# Patient Record
Sex: FEMALE | Race: WHITE | Employment: FULL TIME | URBAN - METROPOLITAN AREA
[De-identification: names, ages, dates, MRNs, and addresses within clinical notes are randomized per-mention and may not be internally consistent; named-entity substitution may affect disease eponyms.]

---

## 2018-05-19 ENCOUNTER — HOSPITAL ENCOUNTER (OUTPATIENT)
Age: 53
Discharge: HOME | End: 2018-05-19
Payer: COMMERCIAL

## 2018-05-19 LAB — TSH SERPL DL<=0.05 MIU/L-ACNC: 1.44 UIU/ML (ref 0.27–4.2)

## 2018-05-19 PROCEDURE — 86376 MICROSOMAL ANTIBODY EACH: CPT

## 2018-05-19 PROCEDURE — 84443 ASSAY THYROID STIM HORMONE: CPT

## 2018-05-19 PROCEDURE — 36415 COLL VENOUS BLD VENIPUNCTURE: CPT

## 2018-05-19 PROCEDURE — 86800 THYROGLOBULIN ANTIBODY: CPT

## 2018-05-21 LAB
PATHOLOGIST REVIEW: NORMAL
THYROGLOBULIN AB: <0.9 IU/ML (ref 0–4)
THYROID PEROXIDASE (TPO) ABS: <0.3 IU/ML (ref 0–9)

## 2022-12-01 ENCOUNTER — HOSPITAL ENCOUNTER (INPATIENT)
Age: 57
LOS: 1 days | Discharge: HOME | End: 2022-12-03
Attending: EMERGENCY MEDICINE | Admitting: INTERNAL MEDICINE
Payer: COMMERCIAL

## 2022-12-01 DIAGNOSIS — I48.91 NEW ONSET ATRIAL FIBRILLATION (HCC): Primary | ICD-10-CM

## 2022-12-01 DIAGNOSIS — E07.9 THYROID DISEASE: ICD-10-CM

## 2022-12-01 DIAGNOSIS — I48.91 ATRIAL FIBRILLATION WITH RVR (HCC): ICD-10-CM

## 2022-12-01 PROCEDURE — 99285 EMERGENCY DEPT VISIT HI MDM: CPT

## 2022-12-01 PROCEDURE — 93005 ELECTROCARDIOGRAM TRACING: CPT | Performed by: EMERGENCY MEDICINE

## 2022-12-02 ENCOUNTER — APPOINTMENT (OUTPATIENT)
Dept: INPATIENT UNIT | Age: 57
End: 2022-12-02
Payer: COMMERCIAL

## 2022-12-02 ENCOUNTER — ANESTHESIA (OUTPATIENT)
Dept: INPATIENT UNIT | Age: 57
End: 2022-12-02
Payer: COMMERCIAL

## 2022-12-02 ENCOUNTER — ANESTHESIA EVENT (OUTPATIENT)
Dept: INPATIENT UNIT | Age: 57
End: 2022-12-02
Payer: COMMERCIAL

## 2022-12-02 ENCOUNTER — APPOINTMENT (OUTPATIENT)
Dept: GENERAL RADIOLOGY | Age: 57
End: 2022-12-02
Payer: COMMERCIAL

## 2022-12-02 LAB
ADENOVIRUS BY PCR: NOT DETECTED
ALBUMIN SERPL-MCNC: 4 G/DL (ref 3.5–5.2)
ALP BLD-CCNC: 57 U/L (ref 35–104)
ALT SERPL-CCNC: 26 U/L (ref 0–32)
ANION GAP SERPL CALCULATED.3IONS-SCNC: 7 MMOL/L (ref 7–16)
ANION GAP SERPL CALCULATED.3IONS-SCNC: 9 MMOL/L (ref 7–16)
AST SERPL-CCNC: 29 U/L (ref 0–31)
BASOPHILS ABSOLUTE: 0.01 E9/L (ref 0–0.2)
BASOPHILS ABSOLUTE: 0.02 E9/L (ref 0–0.2)
BASOPHILS RELATIVE PERCENT: 0.3 % (ref 0–2)
BASOPHILS RELATIVE PERCENT: 0.5 % (ref 0–2)
BILIRUB SERPL-MCNC: 0.6 MG/DL (ref 0–1.2)
BORDETELLA PARAPERTUSSIS BY PCR: NOT DETECTED
BORDETELLA PERTUSSIS BY PCR: NOT DETECTED
BUN BLDV-MCNC: 20 MG/DL (ref 6–20)
BUN BLDV-MCNC: 20 MG/DL (ref 6–20)
CALCIUM SERPL-MCNC: 9.6 MG/DL (ref 8.6–10.2)
CALCIUM SERPL-MCNC: 9.8 MG/DL (ref 8.6–10.2)
CHLAMYDOPHILIA PNEUMONIAE BY PCR: NOT DETECTED
CHLORIDE BLD-SCNC: 101 MMOL/L (ref 98–107)
CHLORIDE BLD-SCNC: 104 MMOL/L (ref 98–107)
CO2: 26 MMOL/L (ref 22–29)
CO2: 28 MMOL/L (ref 22–29)
CORONAVIRUS 229E BY PCR: NOT DETECTED
CORONAVIRUS HKU1 BY PCR: NOT DETECTED
CORONAVIRUS NL63 BY PCR: NOT DETECTED
CORONAVIRUS OC43 BY PCR: NOT DETECTED
CREAT SERPL-MCNC: 0.8 MG/DL (ref 0.5–1)
CREAT SERPL-MCNC: 0.9 MG/DL (ref 0.5–1)
D DIMER: <200 NG/ML DDU
EKG ATRIAL RATE: 138 BPM
EKG ATRIAL RATE: 267 BPM
EKG ATRIAL RATE: 80 BPM
EKG P AXIS: 46 DEGREES
EKG P AXIS: 48 DEGREES
EKG P-R INTERVAL: 152 MS
EKG P-R INTERVAL: 180 MS
EKG Q-T INTERVAL: 240 MS
EKG Q-T INTERVAL: 406 MS
EKG Q-T INTERVAL: 408 MS
EKG QRS DURATION: 70 MS
EKG QRS DURATION: 74 MS
EKG QRS DURATION: 76 MS
EKG QTC CALCULATION (BAZETT): 376 MS
EKG QTC CALCULATION (BAZETT): 449 MS
EKG QTC CALCULATION (BAZETT): 468 MS
EKG R AXIS: -129 DEGREES
EKG R AXIS: -80 DEGREES
EKG R AXIS: -89 DEGREES
EKG T AXIS: -2 DEGREES
EKG T AXIS: -84 DEGREES
EKG T AXIS: 50 DEGREES
EKG VENTRICULAR RATE: 148 BPM
EKG VENTRICULAR RATE: 73 BPM
EKG VENTRICULAR RATE: 80 BPM
EOSINOPHILS ABSOLUTE: 0.05 E9/L (ref 0.05–0.5)
EOSINOPHILS ABSOLUTE: 0.05 E9/L (ref 0.05–0.5)
EOSINOPHILS RELATIVE PERCENT: 1.3 % (ref 0–6)
EOSINOPHILS RELATIVE PERCENT: 1.4 % (ref 0–6)
GFR SERPL CREATININE-BSD FRML MDRD: >60 ML/MIN/1.73
GFR SERPL CREATININE-BSD FRML MDRD: >60 ML/MIN/1.73
GLUCOSE BLD-MCNC: 105 MG/DL (ref 74–99)
GLUCOSE BLD-MCNC: 95 MG/DL (ref 74–99)
HCT VFR BLD CALC: 38.8 % (ref 34–48)
HCT VFR BLD CALC: 40.4 % (ref 34–48)
HEMOGLOBIN: 13.1 G/DL (ref 11.5–15.5)
HEMOGLOBIN: 14 G/DL (ref 11.5–15.5)
HUMAN METAPNEUMOVIRUS BY PCR: NOT DETECTED
HUMAN RHINOVIRUS/ENTEROVIRUS BY PCR: NOT DETECTED
IMMATURE GRANULOCYTES #: 0.01 E9/L
IMMATURE GRANULOCYTES #: 0.01 E9/L
IMMATURE GRANULOCYTES %: 0.3 % (ref 0–5)
IMMATURE GRANULOCYTES %: 0.3 % (ref 0–5)
INFLUENZA A BY PCR: NOT DETECTED
INFLUENZA B BY PCR: NOT DETECTED
LYMPHOCYTES ABSOLUTE: 1.12 E9/L (ref 1.5–4)
LYMPHOCYTES ABSOLUTE: 1.19 E9/L (ref 1.5–4)
LYMPHOCYTES RELATIVE PERCENT: 28 % (ref 20–42)
LYMPHOCYTES RELATIVE PERCENT: 32.8 % (ref 20–42)
MAGNESIUM: 1.9 MG/DL (ref 1.6–2.6)
MCH RBC QN AUTO: 32.3 PG (ref 26–35)
MCH RBC QN AUTO: 32.7 PG (ref 26–35)
MCHC RBC AUTO-ENTMCNC: 33.8 % (ref 32–34.5)
MCHC RBC AUTO-ENTMCNC: 34.7 % (ref 32–34.5)
MCV RBC AUTO: 94.4 FL (ref 80–99.9)
MCV RBC AUTO: 95.6 FL (ref 80–99.9)
MONOCYTES ABSOLUTE: 0.3 E9/L (ref 0.1–0.95)
MONOCYTES ABSOLUTE: 0.36 E9/L (ref 0.1–0.95)
MONOCYTES RELATIVE PERCENT: 8.3 % (ref 2–12)
MONOCYTES RELATIVE PERCENT: 9 % (ref 2–12)
MYCOPLASMA PNEUMONIAE BY PCR: NOT DETECTED
NEUTROPHILS ABSOLUTE: 2.07 E9/L (ref 1.8–7.3)
NEUTROPHILS ABSOLUTE: 2.44 E9/L (ref 1.8–7.3)
NEUTROPHILS RELATIVE PERCENT: 56.9 % (ref 43–80)
NEUTROPHILS RELATIVE PERCENT: 60.9 % (ref 43–80)
PARAINFLUENZA VIRUS 1 BY PCR: NOT DETECTED
PARAINFLUENZA VIRUS 2 BY PCR: NOT DETECTED
PARAINFLUENZA VIRUS 3 BY PCR: NOT DETECTED
PARAINFLUENZA VIRUS 4 BY PCR: NOT DETECTED
PDW BLD-RTO: 11.9 FL (ref 11.5–15)
PDW BLD-RTO: 12 FL (ref 11.5–15)
PLATELET # BLD: 162 E9/L (ref 130–450)
PLATELET # BLD: 166 E9/L (ref 130–450)
PMV BLD AUTO: 9.2 FL (ref 7–12)
PMV BLD AUTO: 9.3 FL (ref 7–12)
POTASSIUM REFLEX MAGNESIUM: 3.7 MMOL/L (ref 3.5–5)
POTASSIUM SERPL-SCNC: 3.5 MMOL/L (ref 3.5–5)
PRO-BNP: 191 PG/ML (ref 0–125)
RBC # BLD: 4.06 E12/L (ref 3.5–5.5)
RBC # BLD: 4.28 E12/L (ref 3.5–5.5)
RESPIRATORY SYNCYTIAL VIRUS BY PCR: NOT DETECTED
SARS-COV-2, PCR: NOT DETECTED
SODIUM BLD-SCNC: 137 MMOL/L (ref 132–146)
SODIUM BLD-SCNC: 138 MMOL/L (ref 132–146)
T4 FREE: 1 NG/DL (ref 0.93–1.7)
TOTAL PROTEIN: 6.5 G/DL (ref 6.4–8.3)
TROPONIN, HIGH SENSITIVITY: 11 NG/L (ref 0–9)
TSH SERPL DL<=0.05 MIU/L-ACNC: 7.79 UIU/ML (ref 0.27–4.2)
WBC # BLD: 3.6 E9/L (ref 4.5–11.5)
WBC # BLD: 4 E9/L (ref 4.5–11.5)

## 2022-12-02 PROCEDURE — 3700000000 HC ANESTHESIA ATTENDED CARE

## 2022-12-02 PROCEDURE — 2580000003 HC RX 258: Performed by: STUDENT IN AN ORGANIZED HEALTH CARE EDUCATION/TRAINING PROGRAM

## 2022-12-02 PROCEDURE — 7100000011 HC PHASE II RECOVERY - ADDTL 15 MIN

## 2022-12-02 PROCEDURE — 2500000003 HC RX 250 WO HCPCS: Performed by: EMERGENCY MEDICINE

## 2022-12-02 PROCEDURE — 80048 BASIC METABOLIC PNL TOTAL CA: CPT

## 2022-12-02 PROCEDURE — 6370000000 HC RX 637 (ALT 250 FOR IP): Performed by: NURSE PRACTITIONER

## 2022-12-02 PROCEDURE — 84439 ASSAY OF FREE THYROXINE: CPT

## 2022-12-02 PROCEDURE — 5A2204Z RESTORATION OF CARDIAC RHYTHM, SINGLE: ICD-10-PCS | Performed by: INTERNAL MEDICINE

## 2022-12-02 PROCEDURE — 85378 FIBRIN DEGRADE SEMIQUANT: CPT

## 2022-12-02 PROCEDURE — 2500000003 HC RX 250 WO HCPCS: Performed by: NURSE ANESTHETIST, CERTIFIED REGISTERED

## 2022-12-02 PROCEDURE — 6360000002 HC RX W HCPCS: Performed by: NURSE ANESTHETIST, CERTIFIED REGISTERED

## 2022-12-02 PROCEDURE — 2580000003 HC RX 258: Performed by: NURSE PRACTITIONER

## 2022-12-02 PROCEDURE — 96374 THER/PROPH/DIAG INJ IV PUSH: CPT

## 2022-12-02 PROCEDURE — 36415 COLL VENOUS BLD VENIPUNCTURE: CPT

## 2022-12-02 PROCEDURE — 2580000003 HC RX 258: Performed by: NURSE ANESTHETIST, CERTIFIED REGISTERED

## 2022-12-02 PROCEDURE — 83735 ASSAY OF MAGNESIUM: CPT

## 2022-12-02 PROCEDURE — 6370000000 HC RX 637 (ALT 250 FOR IP): Performed by: STUDENT IN AN ORGANIZED HEALTH CARE EDUCATION/TRAINING PROGRAM

## 2022-12-02 PROCEDURE — 80053 COMPREHEN METABOLIC PANEL: CPT

## 2022-12-02 PROCEDURE — 0202U NFCT DS 22 TRGT SARS-COV-2: CPT

## 2022-12-02 PROCEDURE — 2580000003 HC RX 258: Performed by: ANESTHESIOLOGY

## 2022-12-02 PROCEDURE — 2500000003 HC RX 250 WO HCPCS

## 2022-12-02 PROCEDURE — 84480 ASSAY TRIIODOTHYRONINE (T3): CPT

## 2022-12-02 PROCEDURE — 93005 ELECTROCARDIOGRAM TRACING: CPT | Performed by: INTERNAL MEDICINE

## 2022-12-02 PROCEDURE — 7100000010 HC PHASE II RECOVERY - FIRST 15 MIN

## 2022-12-02 PROCEDURE — 6360000002 HC RX W HCPCS

## 2022-12-02 PROCEDURE — 71045 X-RAY EXAM CHEST 1 VIEW: CPT

## 2022-12-02 PROCEDURE — 2060000000 HC ICU INTERMEDIATE R&B

## 2022-12-02 PROCEDURE — 84484 ASSAY OF TROPONIN QUANT: CPT

## 2022-12-02 PROCEDURE — 85025 COMPLETE CBC W/AUTO DIFF WBC: CPT

## 2022-12-02 PROCEDURE — 92960 CARDIOVERSION ELECTRIC EXT: CPT

## 2022-12-02 PROCEDURE — 3700000001 HC ADD 15 MINUTES (ANESTHESIA)

## 2022-12-02 PROCEDURE — 84443 ASSAY THYROID STIM HORMONE: CPT

## 2022-12-02 PROCEDURE — 83880 ASSAY OF NATRIURETIC PEPTIDE: CPT

## 2022-12-02 RX ORDER — SODIUM CHLORIDE 0.9 % (FLUSH) 0.9 %
5-40 SYRINGE (ML) INJECTION EVERY 12 HOURS SCHEDULED
Status: DISCONTINUED | OUTPATIENT
Start: 2022-12-02 | End: 2022-12-03 | Stop reason: HOSPADM

## 2022-12-02 RX ORDER — LEVOTHYROXINE SODIUM 88 UG/1
88 TABLET ORAL DAILY
Status: DISCONTINUED | OUTPATIENT
Start: 2022-12-02 | End: 2022-12-03 | Stop reason: HOSPADM

## 2022-12-02 RX ORDER — SODIUM CHLORIDE 9 MG/ML
INJECTION, SOLUTION INTRAVENOUS CONTINUOUS PRN
Status: DISCONTINUED | OUTPATIENT
Start: 2022-12-02 | End: 2022-12-02 | Stop reason: SDUPTHER

## 2022-12-02 RX ORDER — MIDAZOLAM HYDROCHLORIDE 1 MG/ML
INJECTION INTRAMUSCULAR; INTRAVENOUS PRN
Status: DISCONTINUED | OUTPATIENT
Start: 2022-12-02 | End: 2022-12-02 | Stop reason: SDUPTHER

## 2022-12-02 RX ORDER — ACETAMINOPHEN 325 MG/1
650 TABLET ORAL EVERY 6 HOURS PRN
Status: DISCONTINUED | OUTPATIENT
Start: 2022-12-02 | End: 2022-12-03 | Stop reason: HOSPADM

## 2022-12-02 RX ORDER — PROPOFOL 10 MG/ML
INJECTION, EMULSION INTRAVENOUS PRN
Status: DISCONTINUED | OUTPATIENT
Start: 2022-12-02 | End: 2022-12-02 | Stop reason: SDUPTHER

## 2022-12-02 RX ORDER — SODIUM CHLORIDE 0.9 % (FLUSH) 0.9 %
5-40 SYRINGE (ML) INJECTION PRN
Status: DISCONTINUED | OUTPATIENT
Start: 2022-12-02 | End: 2022-12-03 | Stop reason: HOSPADM

## 2022-12-02 RX ORDER — 0.9 % SODIUM CHLORIDE 0.9 %
250 INTRAVENOUS SOLUTION INTRAVENOUS ONCE
Status: COMPLETED | OUTPATIENT
Start: 2022-12-02 | End: 2022-12-02

## 2022-12-02 RX ORDER — DILTIAZEM HYDROCHLORIDE 100 MG/1
INJECTION, POWDER, LYOPHILIZED, FOR SOLUTION INTRAVENOUS CONTINUOUS PRN
Status: DISCONTINUED | OUTPATIENT
Start: 2022-12-02 | End: 2022-12-02 | Stop reason: SDUPTHER

## 2022-12-02 RX ORDER — SODIUM CHLORIDE 9 MG/ML
INJECTION, SOLUTION INTRAVENOUS PRN
Status: DISCONTINUED | OUTPATIENT
Start: 2022-12-02 | End: 2022-12-03 | Stop reason: HOSPADM

## 2022-12-02 RX ORDER — POTASSIUM CHLORIDE 7.45 MG/ML
10 INJECTION INTRAVENOUS PRN
Status: DISCONTINUED | OUTPATIENT
Start: 2022-12-02 | End: 2022-12-03 | Stop reason: HOSPADM

## 2022-12-02 RX ORDER — 0.9 % SODIUM CHLORIDE 0.9 %
1000 INTRAVENOUS SOLUTION INTRAVENOUS ONCE
Status: COMPLETED | OUTPATIENT
Start: 2022-12-02 | End: 2022-12-02

## 2022-12-02 RX ORDER — SODIUM CHLORIDE 9 MG/ML
INJECTION, SOLUTION INTRAVENOUS CONTINUOUS
Status: DISCONTINUED | OUTPATIENT
Start: 2022-12-02 | End: 2022-12-03

## 2022-12-02 RX ORDER — ONDANSETRON 4 MG/1
4 TABLET, ORALLY DISINTEGRATING ORAL EVERY 8 HOURS PRN
Status: DISCONTINUED | OUTPATIENT
Start: 2022-12-02 | End: 2022-12-03 | Stop reason: HOSPADM

## 2022-12-02 RX ORDER — 0.9 % SODIUM CHLORIDE 0.9 %
500 INTRAVENOUS SOLUTION INTRAVENOUS ONCE
Status: COMPLETED | OUTPATIENT
Start: 2022-12-02 | End: 2022-12-02

## 2022-12-02 RX ORDER — KETAMINE HYDROCHLORIDE 10 MG/ML
INJECTION, SOLUTION INTRAMUSCULAR; INTRAVENOUS PRN
Status: DISCONTINUED | OUTPATIENT
Start: 2022-12-02 | End: 2022-12-02 | Stop reason: SDUPTHER

## 2022-12-02 RX ORDER — MIDAZOLAM HYDROCHLORIDE 1 MG/ML
INJECTION INTRAMUSCULAR; INTRAVENOUS
Status: COMPLETED
Start: 2022-12-02 | End: 2022-12-02

## 2022-12-02 RX ORDER — SODIUM CHLORIDE 0.9 % (FLUSH) 0.9 %
10 SYRINGE (ML) INJECTION PRN
Status: DISCONTINUED | OUTPATIENT
Start: 2022-12-02 | End: 2022-12-03 | Stop reason: HOSPADM

## 2022-12-02 RX ORDER — SENNA PLUS 8.6 MG/1
1 TABLET ORAL DAILY PRN
Status: DISCONTINUED | OUTPATIENT
Start: 2022-12-02 | End: 2022-12-03 | Stop reason: HOSPADM

## 2022-12-02 RX ORDER — DILTIAZEM HYDROCHLORIDE 5 MG/ML
10 INJECTION INTRAVENOUS ONCE
Status: COMPLETED | OUTPATIENT
Start: 2022-12-02 | End: 2022-12-02

## 2022-12-02 RX ORDER — SODIUM CHLORIDE 0.9 % (FLUSH) 0.9 %
10 SYRINGE (ML) INJECTION EVERY 12 HOURS SCHEDULED
Status: DISCONTINUED | OUTPATIENT
Start: 2022-12-02 | End: 2022-12-03 | Stop reason: HOSPADM

## 2022-12-02 RX ORDER — ONDANSETRON 2 MG/ML
4 INJECTION INTRAMUSCULAR; INTRAVENOUS EVERY 6 HOURS PRN
Status: DISCONTINUED | OUTPATIENT
Start: 2022-12-02 | End: 2022-12-03 | Stop reason: HOSPADM

## 2022-12-02 RX ORDER — MIDODRINE HYDROCHLORIDE 5 MG/1
5 TABLET ORAL ONCE
Status: COMPLETED | OUTPATIENT
Start: 2022-12-02 | End: 2022-12-02

## 2022-12-02 RX ORDER — POTASSIUM CHLORIDE 20 MEQ/1
40 TABLET, EXTENDED RELEASE ORAL PRN
Status: DISCONTINUED | OUTPATIENT
Start: 2022-12-02 | End: 2022-12-03 | Stop reason: HOSPADM

## 2022-12-02 RX ORDER — GLYCOPYRROLATE 0.2 MG/ML
INJECTION INTRAMUSCULAR; INTRAVENOUS PRN
Status: DISCONTINUED | OUTPATIENT
Start: 2022-12-02 | End: 2022-12-02 | Stop reason: SDUPTHER

## 2022-12-02 RX ORDER — ACETAMINOPHEN 650 MG/1
650 SUPPOSITORY RECTAL EVERY 6 HOURS PRN
Status: DISCONTINUED | OUTPATIENT
Start: 2022-12-02 | End: 2022-12-03 | Stop reason: HOSPADM

## 2022-12-02 RX ADMIN — APIXABAN 5 MG: 5 TABLET, FILM COATED ORAL at 08:17

## 2022-12-02 RX ADMIN — PROPOFOL 30 MG: 10 INJECTION, EMULSION INTRAVENOUS at 10:01

## 2022-12-02 RX ADMIN — SODIUM CHLORIDE 1000 ML: 9 INJECTION, SOLUTION INTRAVENOUS at 10:53

## 2022-12-02 RX ADMIN — SODIUM CHLORIDE 500 ML: 9 INJECTION, SOLUTION INTRAVENOUS at 16:06

## 2022-12-02 RX ADMIN — DILTIAZEM HYDROCHLORIDE 10 MG: 5 INJECTION, SOLUTION INTRAVENOUS at 00:27

## 2022-12-02 RX ADMIN — SODIUM CHLORIDE, PRESERVATIVE FREE 10 ML: 5 INJECTION INTRAVENOUS at 21:16

## 2022-12-02 RX ADMIN — SODIUM CHLORIDE 1000 ML: 9 INJECTION, SOLUTION INTRAVENOUS at 13:07

## 2022-12-02 RX ADMIN — DEXTROSE MONOHYDRATE 5 MG/HR: 50 INJECTION, SOLUTION INTRAVENOUS at 01:11

## 2022-12-02 RX ADMIN — KETAMINE HYDROCHLORIDE 30 MG: 10 INJECTION INTRAMUSCULAR; INTRAVENOUS at 10:01

## 2022-12-02 RX ADMIN — SODIUM CHLORIDE: 9 INJECTION, SOLUTION INTRAVENOUS at 09:57

## 2022-12-02 RX ADMIN — SODIUM CHLORIDE: 9 INJECTION, SOLUTION INTRAVENOUS at 22:56

## 2022-12-02 RX ADMIN — DILTIAZEM HYDROCHLORIDE 10 MG/HR: 100 INJECTION, POWDER, LYOPHILIZED, FOR SOLUTION INTRAVENOUS at 09:50

## 2022-12-02 RX ADMIN — MIDAZOLAM 2 MG: 1 INJECTION INTRAMUSCULAR; INTRAVENOUS at 10:01

## 2022-12-02 RX ADMIN — DEXTROSE MONOHYDRATE 7.5 MG/HR: 50 INJECTION, SOLUTION INTRAVENOUS at 02:21

## 2022-12-02 RX ADMIN — APIXABAN 5 MG: 5 TABLET, FILM COATED ORAL at 21:16

## 2022-12-02 RX ADMIN — GLYCOPYRROLATE 0.1 MG: 0.2 INJECTION INTRAMUSCULAR; INTRAVENOUS at 10:01

## 2022-12-02 RX ADMIN — MIDODRINE HYDROCHLORIDE 5 MG: 5 TABLET ORAL at 16:06

## 2022-12-02 ASSESSMENT — ENCOUNTER SYMPTOMS
VOMITING: 0
NAUSEA: 0
BACK PAIN: 0
WHEEZING: 0
COUGH: 0
DIARRHEA: 0
SORE THROAT: 0
ABDOMINAL DISTENTION: 0
SINUS PRESSURE: 0
EYE DISCHARGE: 0
SHORTNESS OF BREATH: 0
EYE PAIN: 0
EYE REDNESS: 0

## 2022-12-02 ASSESSMENT — PAIN SCALES - GENERAL
PAINLEVEL_OUTOF10: 0

## 2022-12-02 ASSESSMENT — PAIN - FUNCTIONAL ASSESSMENT
PAIN_FUNCTIONAL_ASSESSMENT: NONE - DENIES PAIN
PAIN_FUNCTIONAL_ASSESSMENT: 0-10
PAIN_FUNCTIONAL_ASSESSMENT: NONE - DENIES PAIN

## 2022-12-02 ASSESSMENT — LIFESTYLE VARIABLES: SMOKING_STATUS: 0

## 2022-12-02 NOTE — CONSULTS
[facility] at [address] [place] [address]    Name: Chioma Snyder    Age: 61 y.o. Date of Admission: 12/1/2022 11:22 PM    Date of Service: 12/2/2022    Reason for Consultation: atrial fib Niranjan Ellington    Referring Physician: Dr Rose Mondragon  Primary Care Physician: Danielle Michel MD    History of Present Illness: The patient is a 61y.o. year old female states no heart history and was feeling well when she went to bet at about 9:15 pm yesterday, woke up suddenly from sleep at 10:30 with puunding in her chest , a little lightheaded. Wasn't going away or getting any better so came to ED and was found to be in the 140's. Placed on cardizem gtt. No recent illnesses and no recent travel. H/o hypothyroid. Mother has afib and a PPM. No sleep apnea. , nonsmoker, nondrinker. States otherwise has been healthy. Past Medical History:   Past Medical History:   Diagnosis Date    Thyroid disease        Review of Systems:   Constitutional: No fever, chills, sweats  Cardiac: As per HPI  Pulmonary: No cough, wheeze, hemoptysis  HEENT: No visual disturbances, difficult swallowing  GI: No nausea, vomiting, diarrhea, abdominal pain, rectal bleeding  : No dysuria or hematuria  Endocrine: No excessive thirst, heat or cold intolerance. Musculoskeletal: No joint pain or muscle aches. No claudication  Skin: No skin breakdown or rashes  Neuro: No headache, confusion, or seizures  Psych: No depression, anxiety    Family History:  No family history on file.     Social History:  Social History     Socioeconomic History    Marital status: Single     Spouse name: Not on file    Number of children: Not on file    Years of education: Not on file    Highest education level: Not on file   Occupational History    Not on file   Tobacco Use    Smoking status: Never    Smokeless tobacco: Never   Substance and Sexual Activity    Alcohol use: No    Drug use: No    Sexual activity: Not on file   Other Topics Concern    Not on file   Social History Narrative    Not on file     Social Determinants of Health     Financial Resource Strain: Not on file   Food Insecurity: Not on file   Transportation Needs: Not on file   Physical Activity: Not on file   Stress: Not on file   Social Connections: Not on file   Intimate Partner Violence: Not on file   Housing Stability: Not on file       Allergies:  No Known Allergies    Home Medications:  Prior to Admission medications    Medication Sig Start Date End Date Taking? Authorizing Provider   vitamin D (CHOLECALCIFEROL) 1000 UNIT TABS tablet Take 800 Units by mouth daily. Historical Provider, MD   Multiple Vitamins-Minerals (THERAPEUTIC MULTIVITAMIN-MINERALS) tablet Take 1 tablet by mouth daily. Patient not taking: Reported on 12/2/2022    Historical Provider, MD   naproxen (NAPROSYN) 500 MG tablet Take 1 tablet by mouth 2 times daily for 7 days. 12/27/12 1/3/13  Fatuma Arenas MD   levothyroxine (SYNTHROID) 88 MCG tablet Take 88 mcg by mouth daily.     Patient not taking: Reported on 12/2/2022    Historical Provider, MD       Current Medications:  Current Facility-Administered Medications   Medication Dose Route Frequency Provider Last Rate Last Admin    dilTIAZem 100 mg in dextrose 5 % 100 mL infusion (ADD-[place])  2.5-15 mg/hr IntraVENous Continuous Clearnce Burnet, DO 10 mL/hr at 12/02/22 0816 10 mg/hr at 12/02/22 0816    levothyroxine (SYNTHROID) tablet 88 mcg  88 mcg Oral Daily Marilou Good, APRN - CNP        sodium chloride flush 0.9 % injection 10 mL  10 mL IntraVENous 2 times per day Marilou Good, APRN - CNP        sodium chloride flush 0.9 % injection 10 mL  10 mL IntraVENous PRN Marilou Good, APRN - CNP        0.9 % sodium chloride infusion   IntraVENous PRN Marilou Good, APRN - CNP        potassium chloride (KLOR-CON M) extended release tablet 40 mEq  40 mEq Oral PRN Marilou Good, APRN - CNP        Or    potassium bicarb-citric acid (EFFER-K) effervescent tablet 40 mEq  40 mEq Oral PRN Joanie Shameka, APRN - CNP        Or    potassium chloride 10 mEq/100 mL IVPB (Peripheral Line)  10 mEq IntraVENous PRN Joanie Shameka, APRN - CNP        ondansetron (ZOFRAN-ODT) disintegrating tablet 4 mg  4 mg Oral Q8H PRN Joanie Shameka, APRN - CNP        Or    ondansetron (ZOFRAN) injection 4 mg  4 mg IntraVENous Q6H PRN Joanie Shameka, APRN - CNP        senna (SENOKOT) tablet 8.6 mg  1 tablet Oral Daily PRN Joanie Shameka, APRN - CNP        acetaminophen (TYLENOL) tablet 650 mg  650 mg Oral Q6H PRN Joanie Shameka, APRN - CNP        Or    acetaminophen (TYLENOL) suppository 650 mg  650 mg Rectal Q6H PRN Joanie Shameka, APRN - CNP        apixaban (ELIQUIS) tablet 5 mg  5 mg Oral BID Joanie Shameka, APRN - CNP   5 mg at 12/02/22 0817    perflutren lipid microspheres (DEFINITY) injection 1.5 mL  1.5 mL IntraVENous ONCE PRN Jordon Bee MD          dilTIAZem 10 mg/hr (12/02/22 0816)    sodium chloride         Physical Exam:  BP (!) 92/58   Pulse (!) 131   Temp 97.6 °F (36.4 °C) (Oral)   Resp 18   Ht 5' 8\" (1.727 m)   Wt 138 lb 9.6 oz (62.9 kg)   SpO2 98%   BMI 21.07 kg/m²   Weight change: Wt Readings from Last 3 Encounters:   12/02/22 138 lb 9.6 oz (62.9 kg)     General: Awake, alert, oriented x3, no acute distress  HEENT: Normocephalic and atraumatic, extraocular movements intact, pupils equal and round, moist mucus membranes, sclera anicteric  Neck: No JVD, no carotid bruits, no thyromegaly, no adenopathy  Cardiac: tachy regular normal S1 and physiologically split S2, no S3, no S4. Apical impulse is nondisplaced. No murmurs, no pericardial rubs, no clicks. Carotid upstrokes brisk. Respiratory: Clear bilaterally; no wheezes, no rales, no rhonchi. Unlabored respirations  Abdomen: Soft, nontender, nondistended, bowel sounds+, no hepatomegaly, no masses, no abdominal bruits  Extremities: No edema, no cyanosis, no clubbing.   Distal pulses intact  Skin: Intact, warm and dry, no rashes, no breakdown  Musculoskeletal: normal tone and strength in the upper and lower extremities bilaterally  Neuro: No focal deficits. Moves all extremities appropriately to command. Normal sensation in the upper and lower extremities bilaterally  Psychiatric: Cooperative, and normal affect    Intake/Output:    Intake/Output Summary (Last 24 hours) at 12/2/2022 0819  Last data filed at 12/2/2022 0816  Gross per 24 hour   Intake 55.8 ml   Output --   Net 55.8 ml     I/O this shift: In: 55.8 [I.V.:55.8]  Out: -     Laboratory Tests:  Last 3 CBC:  Recent Labs     12/02/22 0006 12/02/22 0228   WBC 3.6* 4.0*   RBC 4.06 4.28   HGB 13.1 14.0   HCT 38.8 40.4   MCV 95.6 94.4   MCH 32.3 32.7   MCHC 33.8 34.7*   RDW 11.9 12.0    166   MPV 9.2 9.3       Last 3 CMP:    Recent Labs     12/02/22 0006 12/02/22 0228    137   K 3.5 3.7    104   CO2 28 26   BUN 20 20   CREATININE 0.9 0.8   GLUCOSE 105* 95   CALCIUM 9.6 9.8   PROT  --  6.5   LABALBU  --  4.0   BILITOT  --  0.6   ALKPHOS  --  57   AST  --  29   ALT  --  26       Last 3 Mag/Phos:  No results for input(s): MG, PHOS in the last 72 hours. Last 3 CK, CKMB, Troponin  No results for input(s): CKTOTAL, CKMB, TROPONINI in the last 72 hours. Last 3 Pro-BNP:  Recent Labs     12/02/22 0006   PROBNP 191*     Lab Results   Component Value Date    PROBNP 191 (H) 12/02/2022       Last 3 Glucose:     Recent Labs     12/02/22 0006 12/02/22 0228   GLUCOSE 105* 95       Last 3 Coags:  No results for input(s): PROTIME, INR, PTT in the last 72 hours. No results found for: PROTIME, INR, PTT    Last 3 Lipid Panel:  No results for input(s): LDLCALC, HDL, TRIG in the last 72 hours. Invalid input(s): CHLPL  No results found for: LDLCALC  No results found for: HDL  No results found for: TRIG  No components found for: CHLPL    TSH:  No results for input(s): TSH in the last 72 hours.   Lab Results   Component Value Date/Time    TSH 1.440 05/19/2018 01:13 PM ABGs:  No results for input(s): PH, PO2, PCO2, HCO3, BE, O2SAT in the last 72 hours. Lactic Acid:  No results for input(s): LACTA in the last 72 hours. Radiology:  RAD Results:  XR CHEST PORTABLE   Final Result   No acute process. EKG and Telemetry:  12-lead EKG personally reviewed and shows probable atrial flutter 148,low voltage- STT abnormality likely due to flutter waves    Telemetry personally reviewed and shows atrial fib/flutter 130's        ASSESSMENT / PLAN:    1. Atrial fib/flutter- good history for starting at 10:30 last night. On cardizem at 10 mg and rates still difficult to control and bp on the lower side, so offered CV -should be at lowest risk- [id number] VASc score 1 for sex, and less than 48hrs. Would still keep eliquis going for one month then asa. Try to get an echo, but could be done as outpatient  2. Hypothyroid- check TSH      Addend: cardioverted to sinus with 100 joules X 1. Back to floor, probably home later today with one month of [address] and f/u in office one month. Thank you for consultation.     Kamila Lan MD,, [address] [place] [address] at [facility]    Electronically signed by Kamila Lan MD on 12/2/2022 at 8:19 AM

## 2022-12-02 NOTE — ANESTHESIA POSTPROCEDURE EVALUATION
Department of Anesthesiology  Postprocedure Note    Patient: Garfield Monique  MRN: [id number]  YOB: 1965  Date of evaluation: 12/2/2022      Procedure Summary     Date: 12/02/22 Room / Location: [facility] Stage I    Anesthesia Start: 0957 Anesthesia Stop: 2425    Procedure: CARDIOVERSION Diagnosis:     Scheduled Providers:  Responsible Provider: Camila Price MD    Anesthesia Type: MAC ASA Status: 4          Anesthesia Type: MAC    Arlet Phase I: Arlet Score: 10    Arlet Phase II:        Anesthesia Post Evaluation    Patient location during evaluation: PACU  Patient participation: complete - patient participated  Level of consciousness: awake  Pain score: 0  Airway patency: patent  Nausea & Vomiting: no nausea and no vomiting  Complications: no  Cardiovascular status: hemodynamically stable  Respiratory status: acceptable  Hydration status: euvolemic  Comments: Pt resting quietly in bed, awaiting transfer back to floor. No questions or concerns related to anesthesia care.

## 2022-12-02 NOTE — PROGRESS NOTES
Internal Medicine On-call Care Coordination Note    I was called by the ED physician because they recommended admission for this patient and we cover their PCP. The history as I understand it after discussion with the ED physician is as follows:    Presented with palpitations  No known hx of afib  Was RVR in 130s-- given bolus cardizem and HR down to 120s-- started on cardizem gtt    I placed admission orders. Including:    Cardiology consult  Eliquis    Either Dr. Loretta Lam, Dr. Giovani Cabrera, or our coverage will see the patient tomorrow for H&P.     Electronically signed by ERICKSON Caldwell CNP on 12/2/2022 at 12:54 AM

## 2022-12-02 NOTE — PROGRESS NOTES
Dr. Alysia Ruby notified of BP's new orders obtained. Dr. Alysia Ruby ok If BP >100 and if patient asymptomatic pt may discharge. Will continue to monitor.

## 2022-12-02 NOTE — ED PROVIDER NOTES
53-year-old female no known history of atrial fibrillation presents to the emergency department with palpitations. Patient states the symptoms started over the last day. The patient reports no fevers chills cough nausea vomiting dye abdominal pain urinary symptoms leg swelling or other complaints at this time. Patient is not anticoagulated. The history is provided by the patient. Palpitations  Palpitations quality:  Fast  Onset quality:  Gradual  Duration:  1 day  Timing:  Intermittent  Progression:  Waxing and waning  Chronicity:  New  Relieved by:  Nothing  Worsened by:  Nothing  Ineffective treatments:  None tried  Associated symptoms: no back pain, no chest pain, no chest pressure, no cough, no diaphoresis, no dizziness, no lower extremity edema, no malaise/fatigue, no nausea, no shortness of breath, no vomiting and no weakness    Risk factors: no hx of atrial fibrillation       Review of Systems   Constitutional:  Negative for chills, diaphoresis, fever and malaise/fatigue. HENT:  Negative for ear pain, sinus pressure and sore throat. Eyes:  Negative for pain, discharge and redness. Respiratory:  Negative for cough, shortness of breath and wheezing. Cardiovascular:  Positive for palpitations. Negative for chest pain. Gastrointestinal:  Negative for abdominal distention, diarrhea, nausea and vomiting. Genitourinary:  Negative for dysuria and frequency. Musculoskeletal:  Negative for arthralgias and back pain. Skin:  Negative for rash and wound. Neurological:  Negative for dizziness, weakness and headaches. Hematological:  Negative for adenopathy. All other systems reviewed and are negative. Physical Exam  Constitutional:       Appearance: Normal appearance. HENT:      Head: Normocephalic and atraumatic. Nose: Nose normal.   Eyes:      Extraocular Movements: Extraocular movements intact. Pupils: Pupils are equal, round, and reactive to light.    Cardiovascular: Rate and Rhythm: Tachycardia present. Rhythm irregular. Pulses: Normal pulses. Heart sounds: Normal heart sounds. Pulmonary:      Effort: Pulmonary effort is normal.      Breath sounds: Normal breath sounds. Abdominal:      General: Abdomen is flat. Bowel sounds are normal. There is no distension. Palpations: Abdomen is soft. Tenderness: There is no abdominal tenderness. There is no guarding. Musculoskeletal:         General: Normal range of motion. Cervical back: Normal range of motion and neck supple. Skin:     General: Skin is warm. Capillary Refill: Capillary refill takes less than 2 seconds. Neurological:      General: No focal deficit present. Mental Status: She is alert and oriented to person, place, and time. Procedures     MDM  Number of Diagnoses or Management Options  Atrial fibrillation with RVR ([facility] [address]  New onset atrial fibrillation [facility])  Diagnosis management comments: Patient seen and examined. Heart rate in atrial fibrillation with RVR. Patient given initial dose of Cardizem. This did not provide relief though did decrease the rate some. Patient was started on Cardizem drip given this is new onset is felt she warrants admission for further work-up Case discussed with Dr. Rochelle Cabrera who is agreed to admit the patient for further evaluation. ED Course as of 12/02/22 0312   Fri Dec 02, 2022   0017 EKG: This EKG is signed and interpreted by the EP. Time: 23:20  Rate: 148  Rhythm: Atrial fibrillation and Rapid ventricular response  Interpretation: atrial fibrillation (new onset)  Comparison: no previous   [CF]      ED Course User Index  [CF] Bobby Wilson DO      --------------------------------------------- PAST HISTORY ---------------------------------------------  Past Medical History:  has a past medical history of Thyroid disease. Past Surgical History:  has a past surgical history that includes [place] tooth extraction.     Social History:  reports that she has never smoked. She has never used smokeless tobacco. She reports that she does not drink alcohol and does not use drugs. Family History: family history is not on file. The patients home medications have been reviewed. Allergies: Patient has no known allergies.     -------------------------------------------------- RESULTS -------------------------------------------------    Lab  Results for orders placed or performed during the hospital encounter of 12/01/22   CBC with Auto Differential   Result Value Ref Range    WBC 3.6 (L) 4.5 - 11.5 E9/L    RBC 4.06 3.50 - 5.50 E12/L    Hemoglobin 13.1 11.5 - 15.5 g/dL    Hematocrit 38.8 34.0 - 48.0 %    MCV 95.6 80.0 - 99.9 fL    MCH 32.3 26.0 - 35.0 pg    MCHC 33.8 32.0 - 34.5 %    RDW 11.9 11.5 - 15.0 fL    Platelets 214 592 - 028 E9/L    MPV 9.2 7.0 - 12.0 fL    Neutrophils % 56.9 43.0 - 80.0 %    Immature Granulocytes % 0.3 0.0 - 5.0 %    Lymphocytes % 32.8 20.0 - 42.0 %    Monocytes % 8.3 2.0 - 12.0 %    Eosinophils % 1.4 0.0 - 6.0 %    Basophils % 0.3 0.0 - 2.0 %    Neutrophils Absolute 2.07 1.80 - 7.30 E9/L    Immature Granulocytes # 0.01 E9/L    Lymphocytes Absolute 1.19 (L) 1.50 - 4.00 E9/L    Monocytes Absolute 0.30 0.10 - 0.95 E9/L    Eosinophils Absolute 0.05 0.05 - 0.50 E9/L    Basophils Absolute 0.01 0.00 - 0.20 E1/Y   Basic Metabolic Panel   Result Value Ref Range    Sodium 138 132 - 146 mmol/L    Potassium 3.5 3.5 - 5.0 mmol/L    Chloride 101 98 - 107 mmol/L    CO2 28 22 - 29 mmol/L    Anion Gap 9 7 - 16 mmol/L    Glucose 105 (H) 74 - 99 mg/dL    BUN 20 6 - 20 mg/dL    Creatinine 0.9 0.5 - 1.0 mg/dL    Est, Glom Filt Rate >60 >=60 mL/min/1.73    Calcium 9.6 8.6 - 10.2 mg/dL   Troponin   Result Value Ref Range    Troponin, High Sensitivity 11 (H) 0 - 9 ng/L   Brain Natriuretic Peptide   Result Value Ref Range    Pro- (H) 0 - 125 pg/mL   CBC auto differential   Result Value Ref Range    WBC 4.0 (L) 4.5 - 11.5 E9/L RBC 4.28 3.50 - 5.50 E12/L    Hemoglobin 14.0 11.5 - 15.5 g/dL    Hematocrit 40.4 34.0 - 48.0 %    MCV 94.4 80.0 - 99.9 fL    MCH 32.7 26.0 - 35.0 pg    MCHC 34.7 (H) 32.0 - 34.5 %    RDW 12.0 11.5 - 15.0 fL    Platelets 006 546 - 685 E9/L    MPV 9.3 7.0 - 12.0 fL    Neutrophils % 60.9 43.0 - 80.0 %    Immature Granulocytes % 0.3 0.0 - 5.0 %    Lymphocytes % 28.0 20.0 - 42.0 %    Monocytes % 9.0 2.0 - 12.0 %    Eosinophils % 1.3 0.0 - 6.0 %    Basophils % 0.5 0.0 - 2.0 %    Neutrophils Absolute 2.44 1.80 - 7.30 E9/L    Immature Granulocytes # 0.01 E9/L    Lymphocytes Absolute 1.12 (L) 1.50 - 4.00 E9/L    Monocytes Absolute 0.36 0.10 - 0.95 E9/L    Eosinophils Absolute 0.05 0.05 - 0.50 E9/L    Basophils Absolute 0.02 0.00 - 0.20 E9/L       Radiology  XR CHEST PORTABLE    Result Date: 12/2/2022  EXAMINATION: ONE XRAY VIEW OF THE CHEST 12/2/2022 12:05 am COMPARISON: None. HISTORY: ORDERING SYSTEM PROVIDED HISTORY: chest pain TECHNOLOGIST PROVIDED HISTORY: Reason for exam:->chest pain FINDINGS: The lungs are without acute focal process. There is no effusion or pneumothorax. The cardiomediastinal silhouette is without acute process. The osseous structures are without acute process. No acute process. ------------------------- NURSING NOTES AND VITALS REVIEWED ---------------------------  Date / Time Roomed:  12/1/2022 11:22 PM  ED Bed Assignment:  [id number]    The nursing notes within the ED encounter and vital signs as below have been reviewed.    Patient Vitals for the past 24 hrs:   BP Temp Temp src Pulse Resp SpO2 Height Weight   12/02/22 0215 -- -- -- (!) 129 -- -- -- --   12/02/22 0145 107/72 98 °F (36.7 °C) Oral (!) 124 18 100 % -- --   12/02/22 0115 114/85 98 °F (36.7 °C) Oral (!) 125 16 100 % -- --   12/02/22 0043 -- -- -- (!) 129 15 100 % -- --   12/01/22 2318 116/88 97.7 °F (36.5 °C) Oral (!) 49 16 100 % 5' 8\" (1.727 m) 135 lb (61.2 kg)   12/01/22 2312 -- -- -- (!) 151 -- 98 % -- --       Oxygen Saturation Interpretation: Normal      ------------------------------------------ PROGRESS NOTES ------------------------------------------  I have spoken with the patient and discussed todays results, in addition to providing specific details for the plan of care and counseling regarding the diagnosis and prognosis. Their questions are answered at this time and they are agreeable with the plan.      --------------------------------- ADDITIONAL PROVIDER NOTES ---------------------------------  This patient's ED course included: a personal history and physicial examination, re-evaluation prior to disposition, multiple bedside re-evaluations, IV medications, cardiac monitoring, continuous pulse oximetry, and complex medical decision making and emergency management    This patient has remained unchanged during their ED course. Please note that the withdrawal or failure to initiate urgent interventions for this patient would likely result in a life threatening deterioration or permanent disability. Accordingly this patient received 30 minutes of critical care time, excluding separately billable procedures. Clinical Impression  1. New onset atrial fibrillation ([address]    2. Atrial fibrillation with RVR ([address]          Disposition  Patient's disposition: Admit to telemetry  Patient's condition is fair.        Tree Rand DO  12/02/22 2016

## 2022-12-02 NOTE — H&P
Internal Medicine History & Physical     Name: Chi Billings  : 1965  Chief Complaint: Tachycardia ( at pivot)  Primary Care Physician: Denise Wilkerson MD  Admission date: 2022  Date of service: 2022     History of Present Illness  Sander Junior is a 61y.o. year old female with a PMH of who presented with a chief complaint of palpitations. Patient takes 30 mg of [place] Thyroid. She has no other relevant past medical history. She was given diltiazem in the emergency department last night and was unable to convert to normal sinus rhythm. She has no known history of atrial fibrillation. Patient does not drink excessive amounts of caffeine. Patient does not smoke. Patient does not use drugs. Patient stated that she felt her heart beating out of her chest after she went to bed. She said she waited 5 minutes after being woken by the sensation and it did not cease. She immediately came to the ER. Patient has not had any recent illnesses, vaccines, or changes in her medication. In the ED patient presented to the emergency department approximately 11 PM on 2022. She states that she woke up at approximately 10:30 PM with palpitations in her chest.  Patient has no known history of atrial fibrillation. Evaluation in emergency department showed atrial fibrillation with rapid ventricular reentry. Patient was given diltiazem which was unable to control her rate. The patient was admitted for new onset atrial fibrillation. Her blood pressure at baseline runs around 100    Discussed with Dr Del Real Deep will hold off on rate control given her soft Bps and this being the first occurrence of a fib       Past Medical History:   Diagnosis Date    Thyroid disease        Past Surgical History:   Procedure Laterality Date    WISDOM TOOTH EXTRACTION         No family history on file. Social History  Patient lives at home.    At baseline patient ambulates with no assistance  Illicit drugs: Denies TOBACCO:   reports that she has never smoked. She has never used smokeless tobacco.  ETOH:   reports no history of alcohol use. Home Medications  Prior to Admission medications    Medication Sig Start Date End Date Taking? Authorizing Provider   vitamin D (CHOLECALCIFEROL) 1000 UNIT TABS tablet Take 800 Units by mouth daily. Historical Provider, MD   Multiple Vitamins-Minerals (THERAPEUTIC MULTIVITAMIN-MINERALS) tablet Take 1 tablet by mouth daily. Patient not taking: Reported on 12/2/2022    Historical Provider, MD   naproxen (NAPROSYN) 500 MG tablet Take 1 tablet by mouth 2 times daily for 7 days. 12/27/12 1/3/13  Ed Swenson MD   levothyroxine (SYNTHROID) 88 MCG tablet Take 88 mcg by mouth daily. Patient not taking: Reported on 12/2/2022    Historical Provider, MD       Allergies  No Known Allergies    Review of Systems  Please see HPI above. All bolded are positive. All un-bolded are negative.   Constitutional Symptoms: fever, chills, fatigue, generalized weakness, diaphoresis, increase in thirst, loss of appetite  Eyes: vision change   Ears, Nose, Mouth, Throat: hearing loss, nasal congestion, sores in the mouth  Cardiovascular: chest pain, chest heaviness, palpitations  Respiratory: shortness of breath, wheezing, coughing  Gastrointestinal: abdominal pain, nausea, vomiting, diarrhea, constipation, melena, hematochezia, hematemesis  Genitourinary: dysuria, hematuria, increased frequency  Musculoskeletal: lower extremity edema, myalgias, arthralgias, back pain  Integumentary: rashes, itching   Neurological: headache, lightheadedness, dizziness, confusion, syncope, numbness, tingling, focal weakness  Psychiatric: depression, suicidal ideation, anxiety  Endocrine: unintentional weight change  Hematologic/Lymphatic: lymphadenopathy, easy bruising, easy bleeding   Allergic/Immunologic: recurrent infections      Objective  VITALS:  BP (!) 92/58   Pulse (!) 131   Temp 97.6 °F (36.4 °C) (Oral)   Resp 18   Ht 5' 8\" (1.727 m)   Wt 138 lb 9.6 oz (62.9 kg)   SpO2 98%   BMI 21.07 kg/m²     Physical Exam:  General: awake, alert, oriented to person, place, time, and purpose, appears stated age, cooperative, no acute distress, pleasant, appropriate mood  Eyes: conjunctivae/corneas clear, sclera non icteric, EOMI  Ears: no obvious scars, no lesions, no masses, hearing intact  Mouth: mucous membranes moist, no obvious oral sores  Head: normocephalic, atraumatic  Neck: no JVD, no adenopathy, no thyromegaly, neck is supple, trachea is midline  Back: ROM normal, no CVA tenderness. Chest: no pain on palpation  Lungs: clear to auscultation bilaterally, without rhonchi, crackle, wheezing, or rale, no retractions or use of accessory muscles  Heart: regular rate and regular rhythm, no murmur, normal S1, S2 (patient was evaluated post cardioversion)  Abdomen: soft, non-tender; bowel sounds normal; no masses, no organomegaly  : Deferred   Extremities: no lower extremity edema, extremities atraumatic, no cyanosis, no clubbing, 2+ pedal pulses palpated  Skin: normal color, normal texture, normal turgor, no rashes, no lesions  Neurologic:5/5 muscle strength throughout, normal muscle tone throughout, face symmetric, hearing intact, tongue midline, speech appropriate without slurring, sensation to fine touch intact in upper and lower extremities    Labs-   Lab Results   Component Value Date    WBC 4.0 (L) 12/02/2022    HGB 14.0 12/02/2022    HCT 40.4 12/02/2022     12/02/2022     12/02/2022    K 3.7 12/02/2022     12/02/2022    CREATININE 0.8 12/02/2022    BUN 20 12/02/2022    CO2 26 12/02/2022    GLUCOSE 95 12/02/2022    ALT 26 12/02/2022    AST 29 12/02/2022     No results found for: CKTOTAL, CKMB, CKMBINDEX, TROPONINI    Last echocardiogram:  Echo ordered and pending    Recent Radiological Studies:  XR CHEST PORTABLE   Final Result   No acute process.          Atrial Fibrillation CHADSVASC2 Score Stroke Risk: 61 y.o. <65 - 0    female Female - 1   CHF HX: No - 0   HTN HX: No - 0   Stroke/TIA/Thromboembolism No - 0   Vascular Disease HX: No - 0   Diabetes Mellitus No - 0   CHADSVASC 2 Score 1      Annual Stroke Risk 0.6% - low-moderate risk         Assessment  Active Hospital Problems    Diagnosis     Thyroid disease [E07.9]      Priority: Medium    New onset a-fib ([facility] [address] [I48.91]      Priority: Medium       Patient Active Problem List    Diagnosis Date Noted    Thyroid disease      Priority: Medium    New onset a-fib [facility]      Priority: Medium     Plan  Atrial fibrillation with RVR   Cardiology consultation   Rate and rhythm control as per them   Patient cardioverted 12-2-2022  Eliquis   TSH noted  Viral PCR negative   PT/OT  Consults   Cardiology  Home medications to be reconciled and confirmed prior to being ordered  DVT prophylaxis: Eliquis  Code Status full code  Discharge plan: DC today fu with cardiology in 1 month     Alix Briggs DO  Internal medicine   12/2/2022  2:23 PM    I can be reached through [employer]. NOTE:  This report was transcribed using voice recognition software. Every effort was made to ensure accuracy; however, inadvertent computerized transcription errors may be present. Addendum: I have personally participated in an independent face-to-face history and physical exam on the date of service with the patient. I have independently formulated and executed the above assessment and plan. The vitals, labs, imaging, medications and treatment plan were reviewed independently by myself in addition to with the resident doctor. I agree with the above documentation and treatment plan     Electronically signed by Praveen Jimenez MD on 12/2/2022 at 3:33 PM    I can be reached through [facility].

## 2022-12-02 NOTE — CARE COORDINATION
Social Work/Discharge Planning:  Social Work consult noted. Met with patient and completed initial assessment. Explained Social Work role and discussed transition of care/discharge planning. Patient lives alone in a one story house. PTA she is independent with no adaptive device. She had a cardioversion today. Patient PCP is Dr. Jojo Corley and pharmacy is [facility] in [facility]. Provided patient with free 30 day and $10 co-pay savings card for [employer]. Plan is home at discharge.   Electronically signed by YARIEL Prado on 12/2/2022 at 3:35 PM

## 2022-12-02 NOTE — ANESTHESIA PRE PROCEDURE
Department of Anesthesiology  Preprocedure Note       Name:  Yoana Partida   Age:  61 y.o.  :  1965                                          MRN:  [id number]         Date:  2022      Surgeon: Dr. Florence Vaughn    Procedure: DCCV    Medications prior to admission:   Prior to Admission medications    Medication Sig Start Date End Date Taking? Authorizing Provider   vitamin D (CHOLECALCIFEROL) 1000 UNIT TABS tablet Take 800 Units by mouth daily. Historical Provider, MD   Multiple Vitamins-Minerals (THERAPEUTIC MULTIVITAMIN-MINERALS) tablet Take 1 tablet by mouth daily. Patient not taking: Reported on 2022    Historical Provider, MD   naproxen (NAPROSYN) 500 MG tablet Take 1 tablet by mouth 2 times daily for 7 days. 12/27/12 1/3/13  Charity Santamaria MD   levothyroxine (SYNTHROID) 88 MCG tablet Take 88 mcg by mouth daily.     Patient not taking: Reported on 2022    Historical Provider, MD       Current medications:    Current Facility-Administered Medications   Medication Dose Route Frequency Provider Last Rate Last Admin    dilTIAZem 100 mg in dextrose 5 % 100 mL infusion (ADD-[place])  2.5-15 mg/hr IntraVENous Continuous Stevo Ast, DO 10 mL/hr at 22 0816 10 mg/hr at 22 0816    levothyroxine (SYNTHROID) tablet 88 mcg  88 mcg Oral Daily Eugune Alverto, APRN - CNP        sodium chloride flush 0.9 % injection 10 mL  10 mL IntraVENous 2 times per day Eugune Alverto, APRN - CNP        sodium chloride flush 0.9 % injection 10 mL  10 mL IntraVENous PRN Eugune Alverto, APRN - CNP        0.9 % sodium chloride infusion   IntraVENous PRN Eugune Alverto, APRN - CNP        potassium chloride (KLOR-CON M) extended release tablet 40 mEq  40 mEq Oral PRN Eugune Alverto, APRN - CNP        Or    potassium bicarb-citric acid (EFFER-K) effervescent tablet 40 mEq  40 mEq Oral PRN Eugune Alverto, APRN - CNP        Or    potassium chloride 10 mEq/100 mL IVPB (Peripheral Line)  10 mEq IntraVENous PRN Alvie Hakim, APRN - CNP        ondansetron (ZOFRAN-ODT) disintegrating tablet 4 mg  4 mg Oral Q8H PRN Alvie Hakim, APRN - CNP        Or    ondansetron (ZOFRAN) injection 4 mg  4 mg IntraVENous Q6H PRN Alvie Hakim, APRN - CNP        senna (SENOKOT) tablet 8.6 mg  1 tablet Oral Daily PRN Alvie Hakim, APRN - CNP        acetaminophen (TYLENOL) tablet 650 mg  650 mg Oral Q6H PRN Alvie Hakim, APRN - CNP        Or    acetaminophen (TYLENOL) suppository 650 mg  650 mg Rectal Q6H PRN Alvie Hakim, APRN - CNP        apixaban (ELIQUIS) tablet 5 mg  5 mg Oral BID Alvie Hakim, APRN - CNP   5 mg at 12/02/22 2275    perflutren lipid microspheres (DEFINITY) injection 1.5 mL  1.5 mL IntraVENous ONCE PRN Damien Hong MD           Allergies:  No Known Allergies    Problem List:    Patient Active Problem List   Diagnosis Code    Thyroid disease E07.9    [facility] I48.91       Past Medical History:        Diagnosis Date    Thyroid disease        Past Surgical History:        Procedure Laterality Date    WISDOM TOOTH EXTRACTION         Social History:    Social History     Tobacco Use    Smoking status: Never    Smokeless tobacco: Never   Substance Use Topics    Alcohol use:  No                                Counseling given: Not Answered      Vital Signs (Current):   Vitals:    12/02/22 0559 12/02/22 0645 12/02/22 0830 12/02/22 0919   BP:  (!) 92/58 90/74 (!) 87/59   Pulse:  (!) 131 (!) 136 (!) 138   Resp:  18  20   Temp:  97.6 °F (36.4 °C)     TempSrc:  Oral     SpO2:  98%  97%   Weight: 138 lb 9.6 oz (62.9 kg)      Height:                                                  BP Readings from Last 3 Encounters:   12/02/22 (!) 87/59       NPO Status: Time of last liquid consumption: 1600                        Time of last solid consumption: 1600                        Date of last liquid consumption: 12/01/22                        Date of last solid food consumption: 12/01/22    BMI:   Wt Readings from Last 3 Encounters:   12/02/22 138 lb 9.6 oz (62.9 kg)     Body mass index is 21.07 kg/m². CBC:   Lab Results   Component Value Date/Time    WBC 4.0 12/02/2022 02:28 AM    RBC 4.28 12/02/2022 02:28 AM    HGB 14.0 12/02/2022 02:28 AM    HCT 40.4 12/02/2022 02:28 AM    MCV 94.4 12/02/2022 02:28 AM    RDW 12.0 12/02/2022 02:28 AM     12/02/2022 02:28 AM       CMP:   Lab Results   Component Value Date/Time     12/02/2022 02:28 AM    K 3.7 12/02/2022 02:28 AM    K 3.5 12/02/2022 12:06 AM     12/02/2022 02:28 AM    CO2 26 12/02/2022 02:28 AM    BUN 20 12/02/2022 02:28 AM    CREATININE 0.8 12/02/2022 02:28 AM    LABGLOM >60 12/02/2022 02:28 AM    GLUCOSE 95 12/02/2022 02:28 AM    PROT 6.5 12/02/2022 02:28 AM    CALCIUM 9.8 12/02/2022 02:28 AM    BILITOT 0.6 12/02/2022 02:28 AM    ALKPHOS 57 12/02/2022 02:28 AM    AST 29 12/02/2022 02:28 AM    ALT 26 12/02/2022 02:28 AM       POC Tests: No results for input(s): POCGLU, POCNA, POCK, POCCL, POCBUN, POCHEMO, POCHCT in the last 72 hours.     Coags: No results found for: PROTIME, INR, APTT    HCG (If Applicable): No results found for: PREGTESTUR, PREGSERUM, HCG, HCGQUANT     ABGs: No results found for: PHART, PO2ART, UOI6LVK, SFF5NYZ, BEART, B0KUEMMG     Type & Screen (If Applicable):  No results found for: LABABO, LABRH    Drug/Infectious Status (If Applicable):  No results found for: HIV, HEPCAB    COVID-19 Screening (If Applicable): No results found for: COVID19        Anesthesia Evaluation  Patient summary reviewed and Nursing notes reviewed no history of anesthetic complications:   Airway: Mallampati: II  TM distance: >3 FB   Neck ROM: full  Mouth opening: > = 3 FB   Dental:    (+) caps      Pulmonary:Negative Pulmonary ROS breath sounds clear to auscultation      (-) not a current smoker                           Cardiovascular:  Exercise tolerance: good (>4 METS),   (+) dysrhythmias: atrial fibrillation, Rhythm: irregular  Rate: abnormal           Beta Blocker:  Not on Beta Blocker      ROS comment: hypotension     Neuro/Psych:   Negative Neuro/Psych ROS              GI/Hepatic/Renal:             Endo/Other:    (+) hypothyroidism::., .                 Abdominal:             Vascular: negative vascular ROS. Other Findings:           Anesthesia Plan      MAC     ASA 4       Induction: intravenous. Anesthetic plan and risks discussed with patient. Silvio Stringer MD   12/2/2022        Pt seen and evaluated pre-procedure. Risks and benefits of anesthetic plan discussed as per custom.    Black Bonner, APRN - CRNA

## 2022-12-02 NOTE — PROCEDURES
Cardioversion Report      Indication:  Atrial fib/flutter    Procedure:  Direct current electrical cardioversion under deep sedation    Primary Care Physician: Aretha Knight MD    Clinical History:  61y.o. year-old female awakened from sleep with palpitations at 10:30 pm 12/1/22, persisted so came to ED heart rate 148- appeared on ECG to be afib, on tele overnight very regular and persisent rates of 130's despite 10 mg cardizem. Bp riding in the 11'X to 17'L systolic. As [id number] 1 and < 12 hours of duration offered cardioversion. R,B& A explained and she agreed to CV.     Current Outpatient Medications:  Current Facility-Administered Medications   Medication Dose Route Frequency Provider Last Rate Last Admin    dilTIAZem 100 mg in dextrose 5 % 100 mL infusion (ADD-[place])  2.5-15 mg/hr IntraVENous Continuous Haze Framingham, DO 10 mL/hr at 12/02/22 0816 10 mg/hr at 12/02/22 0816    levothyroxine (SYNTHROID) tablet 88 mcg  88 mcg Oral Daily Maranda Kalal, APRN - CNP        sodium chloride flush 0.9 % injection 10 mL  10 mL IntraVENous 2 times per day Maranda Kalal, APRN - CNP        sodium chloride flush 0.9 % injection 10 mL  10 mL IntraVENous PRN Maranda Kennethwall, APRN - CNP        0.9 % sodium chloride infusion   IntraVENous PRN Maranda Kennethwall, APRN - CNP        potassium chloride (KLOR-CON M) extended release tablet 40 mEq  40 mEq Oral PRN Maranda Kennethwall, APRN - CNP        Or    potassium bicarb-citric acid (EFFER-K) effervescent tablet 40 mEq  40 mEq Oral PRN Maranda Dingwall, APRN - CNP        Or    potassium chloride 10 mEq/100 mL IVPB (Peripheral Line)  10 mEq IntraVENous PRN Maranda Kalal, APRN - CNP        ondansetron (ZOFRAN-ODT) disintegrating tablet 4 mg  4 mg Oral Q8H PRN Maranda Dingwall, APRN - CNP        Or    ondansetron (ZOFRAN) injection 4 mg  4 mg IntraVENous Q6H PRN Maranda Kennethwall, APRN - CNP        senna (SENOKOT) tablet 8.6 mg  1 tablet Oral Daily PRN Maranda Kennethwall, APRN - CNP acetaminophen (TYLENOL) tablet 650 mg  650 mg Oral Q6H PRN Drucenargisa Mark, APRN - CNP        Or    acetaminophen (TYLENOL) suppository 650 mg  650 mg Rectal Q6H PRN ucenargisa Mark, APRN - CNP        apixaban (ELIQUIS) tablet 5 mg  5 mg Oral BID Esperanza Flores, APRN - CNP   5 mg at 12/02/22 3937    perflutren lipid microspheres (DEFINITY) injection 1.5 mL  1.5 mL IntraVENous ONCE PRN Nelson James MD        sodium chloride flush 0.9 % injection 5-40 mL  5-40 mL IntraVENous 2 times per day Nelson James MD        sodium chloride flush 0.9 % injection 5-40 mL  5-40 mL IntraVENous PRN Nelson James MD        0.9 % sodium chloride infusion   IntraVENous PRN Nelson James MD         Facility-Administered Medications Ordered in Other Encounters   Medication Dose Route Frequency Provider Last Rate Last Admin    midazolam (VERSED) injection   IntraVENous PRN De Jesus Mar, APRN - CRNA   2 mg at 12/02/22 1001    ketamine (KETALAR) injection   IntraVENous PRN De Jesus Mar, APRN - CRNA   30 mg at 12/02/22 1001    propofol injection   IntraVENous PRN De Jesus Mar, APRN - CRNA   30 mg at 12/02/22 1001    Glycopyrrolate injection   IntraVENous PRN De Jesus Mar, APRN - CRNA   0.1 mg at 12/02/22 1001    0.9 % sodium chloride infusion   IntraVENous Continuous PRN De Jesus Mar, APRN - CRNA   New Bag at 12/02/22 0957    dilTIAZem injection   IntraVENous Continuous PRN De Jesus Mar, APRN - CRNA   10 mg/hr at 12/02/22 2031       Consent:  Patient consented to the above procedure after being advised of the risks, benefits, and alternatives. Risks were outlined including but not limited to death, stroke, myocardial infarction, skin burn or irritation, respiratory suppression with need for intubation and mechanical ventilation, and electrical arrhythmias requiring pacing and/or defibrillation. Procedure Description:  Patient was brought to cardiac procedure room.   Standard monitoring was instituted including EKG monitoring, pulse oximetry, and oxygen by nasal cannula. Defibrillation patches were placed in the anterior-posterior positions on the chest.  NPO status over the past 12 hours was confirmed. Presence of atrial flutter confirmed. Patient was evaluated pre-procedurally by Anesthesia. Procedural time-out was performed. Once adequately sedated by Anesthesia, synchronized electrical shock was delivered with results as below.       Findings:  Initial Rhythm: atrial flutter 138  Sedation: 40mg Propofol, versed and ketamine by Anesthesia  Energy: 100 Joules  Resultant Rhythm: sinus rate 35'H    Complications: none; patient tolerated procedure well and was neurologically intact  Condition: stable    Impression:  Successful electrical cardioversion of atrial flutter to sinus rhythm    Plan:  Discharge to home; no driving today  Continue current medications  Follow-up at the [facility] [address] [place] [address] in a few weeks      Emerson Clifton MD,  [facility] at [address]    Electronically signed by Emerson Clifton MD on 12/2/2022 at 10:10 AM

## 2022-12-02 NOTE — PROGRESS NOTES
Patient Name: Pernell Bryant   Medical Record Number: [id number]  Date: 12/2/2022   Time: 11:07 AM   Room/Bed: 2669/8221-X  Cardioversion Procedure Note  Indication: atrial fibrillation    Consent: The patient was counseled regarding the procedure, its indications, risks, potential complications and alternatives, and any questions were answered. Consent was obtained to proceed. Pre-Medication: ketamine 30 intravenously and propofol 40 mg intravenously    Procedure: The patient was placed in the supine position and the chest area was exposed. The cardioversion pads were applied in the standard manner and configuration. Attempt #1: The defibrillator was set on the synchronous mode and charged to 100 joules. A charge was then delivered which resulted in conversion to normal sinus rhythm. Attempt #2: Not necessary    Attempt #3: Not necessary    The patient tolerated the procedure well.     Complications: None    Electronically Signed by: @[email or web address]@

## 2022-12-02 NOTE — PROGRESS NOTES
Physical Therapy    PT order received and medical chart reviewed 12/2. Pt off unit for cardioversion. Will re-attempt as able. Thank you.     Cyndi Valle, PT, DPT  [id number]

## 2022-12-02 NOTE — PROGRESS NOTES
Occupational Therapy    OT eval and treat orders received and chart reviewed. Attempt made but pt off unit. Will check back at another time/date as able/appropriate.     Madhuri Washington, OTR/L

## 2022-12-03 VITALS
TEMPERATURE: 97.5 F | HEIGHT: 68 IN | BODY MASS INDEX: 21.01 KG/M2 | DIASTOLIC BLOOD PRESSURE: 68 MMHG | WEIGHT: 138.6 LBS | OXYGEN SATURATION: 100 % | SYSTOLIC BLOOD PRESSURE: 109 MMHG | HEART RATE: 66 BPM | RESPIRATION RATE: 18 BRPM

## 2022-12-03 LAB
ALBUMIN SERPL-MCNC: 3.2 G/DL (ref 3.5–5.2)
ALP BLD-CCNC: 42 U/L (ref 35–104)
ALT SERPL-CCNC: 16 U/L (ref 0–32)
ANION GAP SERPL CALCULATED.3IONS-SCNC: 6 MMOL/L (ref 7–16)
AST SERPL-CCNC: 18 U/L (ref 0–31)
BASOPHILS ABSOLUTE: 0.02 E9/L (ref 0–0.2)
BASOPHILS RELATIVE PERCENT: 0.4 % (ref 0–2)
BILIRUB SERPL-MCNC: 0.5 MG/DL (ref 0–1.2)
BUN BLDV-MCNC: 16 MG/DL (ref 6–20)
CALCIUM SERPL-MCNC: 8.6 MG/DL (ref 8.6–10.2)
CHLORIDE BLD-SCNC: 111 MMOL/L (ref 98–107)
CO2: 24 MMOL/L (ref 22–29)
CREAT SERPL-MCNC: 0.8 MG/DL (ref 0.5–1)
EOSINOPHILS ABSOLUTE: 0.09 E9/L (ref 0.05–0.5)
EOSINOPHILS RELATIVE PERCENT: 2 % (ref 0–6)
GFR SERPL CREATININE-BSD FRML MDRD: >60 ML/MIN/1.73
GLUCOSE BLD-MCNC: 99 MG/DL (ref 74–99)
HCT VFR BLD CALC: 35.1 % (ref 34–48)
HEMOGLOBIN: 11.2 G/DL (ref 11.5–15.5)
IMMATURE GRANULOCYTES #: 0.01 E9/L
IMMATURE GRANULOCYTES %: 0.2 % (ref 0–5)
LYMPHOCYTES ABSOLUTE: 1.04 E9/L (ref 1.5–4)
LYMPHOCYTES RELATIVE PERCENT: 23.3 % (ref 20–42)
MCH RBC QN AUTO: 30.9 PG (ref 26–35)
MCHC RBC AUTO-ENTMCNC: 31.9 % (ref 32–34.5)
MCV RBC AUTO: 96.7 FL (ref 80–99.9)
MONOCYTES ABSOLUTE: 0.35 E9/L (ref 0.1–0.95)
MONOCYTES RELATIVE PERCENT: 7.8 % (ref 2–12)
NEUTROPHILS ABSOLUTE: 2.95 E9/L (ref 1.8–7.3)
NEUTROPHILS RELATIVE PERCENT: 66.3 % (ref 43–80)
PDW BLD-RTO: 12.2 FL (ref 11.5–15)
PLATELET # BLD: 150 E9/L (ref 130–450)
PMV BLD AUTO: 9.8 FL (ref 7–12)
POTASSIUM REFLEX MAGNESIUM: 4.1 MMOL/L (ref 3.5–5)
RBC # BLD: 3.63 E12/L (ref 3.5–5.5)
SODIUM BLD-SCNC: 141 MMOL/L (ref 132–146)
TOTAL PROTEIN: 5 G/DL (ref 6.4–8.3)
WBC # BLD: 4.5 E9/L (ref 4.5–11.5)

## 2022-12-03 PROCEDURE — 85025 COMPLETE CBC W/AUTO DIFF WBC: CPT

## 2022-12-03 PROCEDURE — 2580000003 HC RX 258: Performed by: NURSE PRACTITIONER

## 2022-12-03 PROCEDURE — 80053 COMPREHEN METABOLIC PANEL: CPT

## 2022-12-03 PROCEDURE — 93306 TTE W/DOPPLER COMPLETE: CPT

## 2022-12-03 PROCEDURE — 6370000000 HC RX 637 (ALT 250 FOR IP): Performed by: NURSE PRACTITIONER

## 2022-12-03 PROCEDURE — 36415 COLL VENOUS BLD VENIPUNCTURE: CPT

## 2022-12-03 RX ORDER — LEVOTHYROXINE SODIUM 88 UG/1
88 TABLET ORAL DAILY
Qty: 30 TABLET | Refills: 3 | Status: SHIPPED | OUTPATIENT
Start: 2022-12-04

## 2022-12-03 RX ADMIN — SODIUM CHLORIDE, PRESERVATIVE FREE 10 ML: 5 INJECTION INTRAVENOUS at 09:00

## 2022-12-03 RX ADMIN — LEVOTHYROXINE SODIUM 88 MCG: 0.09 TABLET ORAL at 05:17

## 2022-12-03 RX ADMIN — APIXABAN 5 MG: 5 TABLET, FILM COATED ORAL at 08:25

## 2022-12-03 ASSESSMENT — ENCOUNTER SYMPTOMS
VOMITING: 0
DIARRHEA: 0
COUGH: 0
NAUSEA: 0
SHORTNESS OF BREATH: 0

## 2022-12-03 NOTE — PLAN OF CARE
Problem: Discharge Planning  Goal: Discharge to home or other facility with appropriate resources  12/2/2022 2305 by Edmond Ramirez RN  Outcome: Progressing  12/2/2022 1638 by Alexander March  Outcome: Progressing     Problem: Safety - Adult  Goal: Free from fall injury  12/2/2022 2305 by Edmond Ramirez RN  Outcome: Progressing  12/2/2022 1638 by Alexander March  Outcome: Progressing     Problem: Pain  Goal: Verbalizes/displays adequate comfort level or baseline comfort level  12/2/2022 2305 by Edmond Ramirez RN  Outcome: Progressing  12/2/2022 1638 by Alexander March  Outcome: Progressing

## 2022-12-03 NOTE — DISCHARGE SUMMARY
Discharge Summary     Patient ID:  Senia Zaldivar  [id number]  27 y.o. 1965 female  Zaria Georges MD        Admit date: 12/1/2022    Discharge date and time:  12/3/2022  8:22 AM      Activity level: As tolerated  Diet: Cardiac regular  Labs: TSH and Free T4 in 4 weeks  Disposition: Home  Condition on Discharge:Stable  DME: None     Admit Diagnoses:   Patient Active Problem List   Diagnosis    Thyroid disease    New onset a-fib [facility])       Discharge Diagnoses: Principal Problem:    New onset a-fib ([facility] [address]  Active Problems:    Thyroid disease  Resolved Problems:    * No resolved hospital problems. *      Consults:  IP CONSULT TO INTERNAL MEDICINE  IP CONSULT TO SOCIAL WORK  IP CONSULT TO CARDIOLOGY    Procedures: Cardioversion    Hospital Course: Melida Gallegos is a 61y.o. year old female with a PMH of who presented with a chief complaint of palpitations. Patient takes 30 mg of Schroeder Thyroid. She has no other relevant past medical history. She was given diltiazem in the emergency department last night and was unable to convert to normal sinus rhythm. She has no known history of atrial fibrillation. Patient does not drink excessive amounts of caffeine. Patient does not smoke. Patient does not use drugs. Patient stated that she felt her heart beating out of her chest after she went to bed. She said she waited 5 minutes after being woken by the sensation and it did not cease. She immediately came to the ER. Patient has not had any recent illnesses, vaccines, or changes in her medication. She was seen by cardiology and had direct current electrical cardioversion under deep sedation on 12/2/22. She had no reoccurrence of afib since cardioversion. Echo was ordered but delayed due to staffing. She will need echo. Her TSH was elevated she started on 88 mcg of levothyroixine. Will need TSH and Free T4 in 4 weeks. Patient to follow up with pcp in 1 week and cardiology as instructed.       Discharge Exam:  General Appearance:  Comfortable and well-appearing. Vital signs: (most recent): Blood pressure 94/60, pulse 58, temperature 97.5 °F (36.4 °C), temperature source Oral, resp. rate 16, height 5' 8\" (1.727 m), weight 138 lb 9.6 oz (62.9 kg), SpO2 99 %. Lungs:  Normal effort and normal respiratory rate. Breath sounds clear to auscultation. No rales, decreased breath sounds or rhonchi. Heart: Normal rate. Regular rhythm. S1 normal and S2 normal.  No gallop. Abdomen: Abdomen is soft and non-distended. Bowel sounds are normal.   There is no abdominal tenderness. There is no rebound tenderness. There is no guarding. Extremities: Normal range of motion. There is no dependent edema. Skin:  Warm and dry. I/O last 3 completed shifts: In: 3098.8 [I.V.:1184.9; IV Piggyback:1913.9]  Out: -   No intake/output data recorded. LABS:  Recent Labs     12/02/22  0006 12/02/22 0228 12/03/22  0240    137 141   K 3.5 3.7 4.1    104 111*   CO2 28 26 24   BUN 20 20 16   CREATININE 0.9 0.8 0.8   GLUCOSE 105* 95 99   CALCIUM 9.6 9.8 8.6       Recent Labs     12/02/22  0006 12/02/22 0228 12/03/22  0240   WBC 3.6* 4.0* 4.5   RBC 4.06 4.28 3.63   HGB 13.1 14.0 11.2*   HCT 38.8 40.4 35.1   MCV 95.6 94.4 96.7   MCH 32.3 32.7 30.9   MCHC 33.8 34.7* 31.9*   RDW 11.9 12.0 12.2    166 150   MPV 9.2 9.3 9.8       No results for input(s): GLUMET in the last 72 hours. Imaging:  XR CHEST PORTABLE    Result Date: 12/2/2022  EXAMINATION: ONE XRAY VIEW OF THE CHEST 12/2/2022 12:05 am COMPARISON: None. HISTORY: ORDERING SYSTEM PROVIDED HISTORY: chest pain TECHNOLOGIST PROVIDED HISTORY: Reason for exam:->chest pain FINDINGS: The lungs are without acute focal process. There is no effusion or pneumothorax. The cardiomediastinal silhouette is without acute process. The osseous structures are without acute process. No acute process.        Patient Instructions:   Current Discharge Medication List        START taking these medications    Details   apixaban (ELIQUIS) 5 MG TABS tablet Take 1 tablet by mouth 2 times daily  Qty: 60 tablet, Refills: 0           CONTINUE these medications which have CHANGED    Details   levothyroxine (SYNTHROID) 88 MCG tablet Take 1 tablet by mouth daily  Qty: 30 tablet, Refills: 3           CONTINUE these medications which have NOT CHANGED    Details   vitamin D (CHOLECALCIFEROL) 1000 UNIT TABS tablet Take 800 Units by mouth daily. naproxen (NAPROSYN) 500 MG tablet Take 1 tablet by mouth 2 times daily for 7 days.   Qty: 14 tablet, Refills: 0           STOP taking these medications       Multiple Vitamins-Minerals (THERAPEUTIC MULTIVITAMIN-MINERALS) tablet Comments:   Reason for Stopping:                 Note that more than 30 minutes was spent in preparing discharge papers, discussing discharge with patient, medication review, etc.      Signed:    Sasha Cabral DO    Electronically signed by Sasha Cabral DO on 12/3/2022 at 8:22 AM

## 2022-12-03 NOTE — PROGRESS NOTES
[facility] NOTE    Name: Mega Blum    Age: 61 y.o. Date of Admission: 12/1/2022 11:22 PM    Date of Service: 12/3/2022    Reason for Consultation: atrial fib [place]    Referring Physician: Dr Antwon Ni  Primary Care Physician: Dimas Moore MD    History of Present Illness: The patient is a 61y.o. year old female states no heart history and was feeling well when she went to bet at about 9:15 pm yesterday, woke up suddenly from sleep at 10:30 with puunding in her chest , a little lightheaded. Wasn't going away or getting any better so came to ED and was found to be in the 140's. Placed on cardizem gtt. No recent illnesses and no recent travel. H/o hypothyroid. Mother has afib and a PPM. No sleep apnea. , nonsmoker, nondrinker. States otherwise has been healthy. 12/3 interim history  Underwent successful elective cardioversion yesterday. Relative hypotension and receiving IV fluids, echo pending she states that home blood pressures are usually 100/70    Telemetry reviewed: Remains in sinus rhythm. Blood pressure 116/56 and SPO2 normal on room air    Past Medical History:   Past Medical History:   Diagnosis Date    Thyroid disease        Review of Systems:   No fever or chills. No headache or confusion. Denies cough. No chest pain/palpitations/PND. Denies nausea or vomiting      Family History:  No family history on file.     Social History:  Social History     Socioeconomic History    Marital status: Single     Spouse name: Not on file    Number of children: Not on file    Years of education: Not on file    Highest education level: Not on file   Occupational History    Not on file   Tobacco Use    Smoking status: Never    Smokeless tobacco: Never   Substance and Sexual Activity    Alcohol use: No    Drug use: No    Sexual activity: Not on file   Other Topics Concern    Not on file   Social History Narrative    Not on file     Social Determinants of Health     Financial Resource Strain: Not on file   Food Insecurity: Not on file   Transportation Needs: Not on file   Physical Activity: Not on file   Stress: Not on file   Social Connections: Not on file   Intimate Partner Violence: Not on file   Housing Stability: Not on file       Allergies:  No Known Allergies    Home Medications:  Prior to Admission medications    Medication Sig Start Date End Date Taking? Authorizing Provider   apixaban (ELIQUIS) 5 MG TABS tablet Take 1 tablet by mouth 2 times daily 12/2/22  Yes Ronaldo Rosa MD   vitamin D (CHOLECALCIFEROL) 1000 UNIT TABS tablet Take 800 Units by mouth daily. Historical Provider, MD   naproxen (NAPROSYN) 500 MG tablet Take 1 tablet by mouth 2 times daily for 7 days. 12/27/12 1/3/13  Sae Rincon MD   levothyroxine (SYNTHROID) 88 MCG tablet Take 88 mcg by mouth daily.       Historical Provider, MD       Current Medications:  Current Facility-Administered Medications   Medication Dose Route Frequency Provider Last Rate Last Admin    dilTIAZem 100 mg in dextrose 5 % 100 mL infusion (ADD-Straughn)  2.5-15 mg/hr IntraVENous Continuous Krysta Thacker DO   Stopped at 12/02/22 1642    levothyroxine (SYNTHROID) tablet 88 mcg  88 mcg Oral Daily Shelton Granados APRN - CNP   88 mcg at 12/03/22 0517    sodium chloride flush 0.9 % injection 10 mL  10 mL IntraVENous 2 times per day Shelton Granados APRN - CNP   10 mL at 12/02/22 2116    sodium chloride flush 0.9 % injection 10 mL  10 mL IntraVENous PRN Shelton Granados APRN - CNP        0.9 % sodium chloride infusion   IntraVENous PRN ERICKSON Briggs - CNP   Stopped at 12/02/22 1642    potassium chloride (KLOR-CON M) extended release tablet 40 mEq  40 mEq Oral PRN Shelton Granados APRN - CNP        Or    potassium bicarb-citric acid (EFFER-K) effervescent tablet 40 mEq  40 mEq Oral PRN ERICKSON Briggs - HAYLEY        Or    potassium chloride 10 mEq/100 mL IVPB (Peripheral Line)  10 mEq IntraVENous PRN Alvie Hakim, APRN - CNP        ondansetron (ZOFRAN-ODT) disintegrating tablet 4 mg  4 mg Oral Q8H PRN Alvie Hakim, APRN - CNP        Or    ondansetron (ZOFRAN) injection 4 mg  4 mg IntraVENous Q6H PRN Alvie Hakim, APRN - CNP        senna (SENOKOT) tablet 8.6 mg  1 tablet Oral Daily PRN Alvie Hakim, APRN - CNP        acetaminophen (TYLENOL) tablet 650 mg  650 mg Oral Q6H PRN Alvie Hakim, APRN - CNP        Or    acetaminophen (TYLENOL) suppository 650 mg  650 mg Rectal Q6H PRN Alvie Hakim, APRN - CNP        apixaban (ELIQUIS) tablet 5 mg  5 mg Oral BID Alvie Hakim, APRN - CNP   5 mg at 12/02/22 2116    perflutren lipid microspheres (DEFINITY) injection 1.5 mL  1.5 mL IntraVENous ONCE PRN Damien Hong MD        sodium chloride flush 0.9 % injection 5-40 mL  5-40 mL IntraVENous 2 times per day Damien Hong MD        sodium chloride flush 0.9 % injection 5-40 mL  5-40 mL IntraVENous PRN Damien Hong MD        0.9 % sodium chloride infusion   IntraVENous MAURICE Hong MD   Stopped at 12/02/22 1642    sodium chloride flush 0.9 % injection 5-40 mL  5-40 mL IntraVENous 2 times per day Damien Hong MD        sodium chloride flush 0.9 % injection 5-40 mL  5-40 mL IntraVENous PRVIGNESH Hong MD        0.9 % sodium chloride infusion   IntraVENous PRVIGNESH Hong MD   Stopped at 12/02/22 1642    0.9 % sodium chloride infusion   IntraVENous Continuous Magdalena Glaser MD 75 mL/hr at 12/03/22 0522 Rate Verify at 12/03/22 0522      dilTIAZem Stopped (12/02/22 1642)    sodium chloride Stopped (12/02/22 1642)    sodium chloride Stopped (12/02/22 1642)    sodium chloride Stopped (12/02/22 1642)    sodium chloride 75 mL/hr at 12/03/22 0522       Physical Exam:  BP (!) 116/56   Pulse 58   Temp 98 °F (36.7 °C) (Temporal)   Resp 16   Ht 5' 8\" (1.727 m)   Wt 138 lb 9.6 oz (62.9 kg)   SpO2 99%   BMI 21.07 kg/m²   Weight change:    Wt Readings from Last 3 Encounters: 12/02/22 138 lb 9.6 oz (62.9 kg)     General: Awake, alert, oriented x3, no acute distress  Neck: No JVD, no carotid bruits,   Cardiac: Regular rate and rhythm, no S3, no S4. Apical impulse is nondisplaced. No murmurs, no pericardial rubs, no clicks. Carotid upstrokes brisk. Respiratory: Clear bilaterally; no wheezes, no rales, no rhonchi. Unlabored respirations  Abdomen: Soft, nontender, nondistended, bowel sounds+,  Extremities: No edema, no cyanosis, no clubbing. Distal pulses intact  Skin: Intact, warm and dry,   Neuro: No focal deficits. Moves all extremities appropriately to command. Normal sensation in the upper and lower extremities bilaterally  Psychiatric: Cooperative, and normal affect    Intake/Output:    Intake/Output Summary (Last 24 hours) at 12/3/2022 0558  Last data filed at 12/3/2022 0522  Gross per 24 hour   Intake 3098.78 ml   Output --   Net 3098.78 ml     I/O this shift:  In: 2443 [I.V.:529.1; IV Piggyback:1913.9]  Out: -     Laboratory Tests:  Last 3 CBC:  Recent Labs     12/02/22  0006 12/02/22 0228 12/03/22  0240   WBC 3.6* 4.0* 4.5   RBC 4.06 4.28 3.63   HGB 13.1 14.0 11.2*   HCT 38.8 40.4 35.1   MCV 95.6 94.4 96.7   MCH 32.3 32.7 30.9   MCHC 33.8 34.7* 31.9*   RDW 11.9 12.0 12.2    166 150   MPV 9.2 9.3 9.8       Last 3 CMP:    Recent Labs     12/02/22  0006 12/02/22  0228 12/03/22  0240    137 141   K 3.5 3.7 4.1    104 111*   CO2 28 26 24   BUN 20 20 16   CREATININE 0.9 0.8 0.8   GLUCOSE 105* 95 99   CALCIUM 9.6 9.8 8.6   PROT  --  6.5 5.0*   LABALBU  --  4.0 3.2*   BILITOT  --  0.6 0.5   ALKPHOS  --  57 42   AST  --  29 18   ALT  --  26 16       Last 3 Mag/Phos:  Recent Labs     12/02/22  0827   MG 1.9       Last 3 CK, CKMB, Troponin  No results for input(s): CKTOTAL, CKMB, TROPONINI in the last 72 hours.     Last 3 Pro-BNP:  Recent Labs     12/02/22  0006   PROBNP 191*     Lab Results   Component Value Date    PROBNP 191 (H) 12/02/2022       Last 3 Glucose: Recent Labs     12/02/22  0006 12/02/22  0228 12/03/22  0240   GLUCOSE 105* 95 99       Last 3 Coags:  No results for input(s): PROTIME, INR, PTT in the last 72 hours. No results found for: PROTIME, INR, PTT    Last 3 Lipid Panel:  No results for input(s): LDLCALC, HDL, TRIG in the last 72 hours. Invalid input(s): CHLPL  No results found for: LDLCALC  No results found for: HDL  No results found for: TRIG  No components found for: CHLPL    TSH:  Recent Labs     12/02/22 0827   TSH 7.790*     Lab Results   Component Value Date/Time    TSH 7.790 12/02/2022 08:27 AM       ABGs:  No results for input(s): PH, PO2, PCO2, HCO3, BE, O2SAT in the last 72 hours. Lactic Acid:  No results for input(s): LACTA in the last 72 hours. Radiology:  RAD Results:  XR CHEST PORTABLE   Final Result   No acute process. EKG and Telemetry:  12-lead EKG personally reviewed and shows probable atrial flutter 148,low voltage- STT abnormality likely due to flutter waves    Telemetry personally reviewed and shows atrial fib/flutter 130's        ASSESSMENT / PLAN:    1. Atrial fib/flutter- good history for starting at 10:30 on 2/1. Successful elective cardioversion yesterday and remains in sinus rhythm. Blood pressure better and she states that VTc912/70 at home. Stop IV fluids    2.  Hypothyroid-TSH elevated and Synthroid dose has been adjusted    Home today and if echo cannot be performed will obtain as outpatient              Héctor Shea MD,, [address] [place] [address] [facility] at [facility]    Electronically signed by Héctor Shea MD on 12/3/2022 at 5:58 AM

## 2022-12-03 NOTE — PROGRESS NOTES
Progress Note  Date:12/3/2022       [id number]  Patient Pilo Perez     YOB: 1965     Age:57 y.o. Patient seen for follow up of afib with RVR. She is sitting up in bed eating breakfast. She states that she is feeling well. She has been seen by cardiology this am and has maintained in sinus rhythm since having cardioversion. Subjective    Subjective:  Symptoms:  No shortness of breath, cough, chest pain, headache, chest pressure or diarrhea. Diet:  No nausea or vomiting. Review of Systems   Respiratory:  Negative for cough and shortness of breath. Cardiovascular:  Negative for chest pain. Gastrointestinal:  Negative for diarrhea, nausea and vomiting. Objective         Vitals Last 24 Hours:  TEMPERATURE:  Temp  Av.7 °F (36.5 °C)  Min: 97.4 °F (36.3 °C)  Max: 98 °F (36.7 °C)  RESPIRATIONS RANGE: Resp  Av.7  Min: 16  Max: 20  PULSE OXIMETRY RANGE: SpO2  Av.4 %  Min: 97 %  Max: 100 %  PULSE RANGE: Pulse  Av.4  Min: 57  Max: 138  BLOOD PRESSURE RANGE: Systolic (38NKN), VWJ:55 , Min:73 , YCD:490   ; Diastolic (08XRC), CNY:89, Min:43, Max:74    I/O (24Hr): Intake/Output Summary (Last 24 hours) at 12/3/2022 0819  Last data filed at 12/3/2022 0522  Gross per 24 hour   Intake 3042.98 ml   Output --   Net 3042.98 ml     Objective:  General Appearance:  Comfortable and well-appearing. Vital signs: (most recent): Blood pressure 94/60, pulse 58, temperature 97.5 °F (36.4 °C), temperature source Oral, resp. rate 16, height 5' 8\" (1.727 m), weight 138 lb 9.6 oz (62.9 kg), SpO2 99 %. Lungs:  Normal effort and normal respiratory rate. Breath sounds clear to auscultation. No rales, decreased breath sounds or rhonchi. Heart: Normal rate. Regular rhythm. S1 normal and S2 normal.  No gallop. Abdomen: Abdomen is soft and non-distended. Bowel sounds are normal.   There is no abdominal tenderness. There is no rebound tenderness. There is no guarding. Extremities: Normal range of motion. There is no dependent edema. Skin:  Warm and dry. Labs/Imaging/Diagnostics    Labs:  CBC:  Recent Labs     12/02/22 0006 12/02/22 0228 12/03/22 0240   WBC 3.6* 4.0* 4.5   RBC 4.06 4.28 3.63   HGB 13.1 14.0 11.2*   HCT 38.8 40.4 35.1   MCV 95.6 94.4 96.7   RDW 11.9 12.0 12.2    166 150     CHEMISTRIES:  Recent Labs     12/02/22  0006 12/02/22 0228 12/02/22  0827 12/03/22  0240    137  --  141   K 3.5 3.7  --  4.1    104  --  111*   CO2 28 26  --  24   BUN 20 20  --  16   CREATININE 0.9 0.8  --  0.8   GLUCOSE 105* 95  --  99   MG  --   --  1.9  --      PT/INR:No results for input(s): PROTIME, INR in the last 72 hours. APTT:No results for input(s): APTT in the last 72 hours. LIVER PROFILE:  Recent Labs     12/02/22 0228 12/03/22 0240   AST 29 18   ALT 26 16   BILITOT 0.6 0.5   ALKPHOS 57 42       Imaging Last 24 Hours:  XR CHEST PORTABLE    Result Date: 12/2/2022  EXAMINATION: ONE XRAY VIEW OF THE CHEST 12/2/2022 12:05 am COMPARISON: None. HISTORY: ORDERING SYSTEM PROVIDED HISTORY: chest pain TECHNOLOGIST PROVIDED HISTORY: Reason for exam:->chest pain FINDINGS: The lungs are without acute focal process. There is no effusion or pneumothorax. The cardiomediastinal silhouette is without acute process. The osseous structures are without acute process. No acute process. Assessment//Plan           Hospital Problems             Last Modified POA    * (Principal) New onset a-fib ([address] 12/2/2022 Yes    Thyroid disease 12/2/2022 Yes     Assessment & Plan  Afib  Hypothyroidism    Currently in NSR s/p cardioversion. Continue on current medications. Will need TSH and Free T4 checked in 4 weeks. D/C home today. Follow up with pcp in 1 week.      Lucian Muro DO    Electronically signed by Lucian Muro DO on 12/3/22 at 8:19 AM EST

## 2022-12-04 LAB — T3 TOTAL: 98.96 NG/DL (ref 80–200)

## 2025-03-16 ENCOUNTER — APPOINTMENT (OUTPATIENT)
Dept: GENERAL RADIOLOGY | Age: 60
End: 2025-03-16
Payer: COMMERCIAL

## 2025-03-16 ENCOUNTER — HOSPITAL ENCOUNTER (EMERGENCY)
Age: 60
Discharge: HOME | End: 2025-03-16
Attending: EMERGENCY MEDICINE
Payer: COMMERCIAL

## 2025-03-16 VITALS
HEIGHT: 68 IN | BODY MASS INDEX: 19.7 KG/M2 | TEMPERATURE: 98.1 F | HEART RATE: 62 BPM | DIASTOLIC BLOOD PRESSURE: 65 MMHG | WEIGHT: 130 LBS | RESPIRATION RATE: 18 BRPM | SYSTOLIC BLOOD PRESSURE: 127 MMHG | OXYGEN SATURATION: 100 %

## 2025-03-16 DIAGNOSIS — R00.2 PALPITATIONS: Primary | ICD-10-CM

## 2025-03-16 LAB
ANION GAP SERPL CALCULATED.3IONS-SCNC: 11 MMOL/L (ref 7–16)
BASOPHILS # BLD: 0.01 K/UL (ref 0–0.2)
BASOPHILS NFR BLD: 0 % (ref 0–2)
BUN SERPL-MCNC: 15 MG/DL (ref 6–23)
CALCIUM SERPL-MCNC: 9.9 MG/DL (ref 8.6–10.2)
CHLORIDE SERPL-SCNC: 102 MMOL/L (ref 98–107)
CO2 SERPL-SCNC: 29 MMOL/L (ref 22–29)
CREAT SERPL-MCNC: 0.9 MG/DL (ref 0.5–1)
EOSINOPHIL # BLD: 0.03 K/UL (ref 0.05–0.5)
EOSINOPHILS RELATIVE PERCENT: 1 % (ref 0–6)
ERYTHROCYTE [DISTWIDTH] IN BLOOD BY AUTOMATED COUNT: 11.9 % (ref 11.5–15)
GFR, ESTIMATED: 77 ML/MIN/1.73M2
GLUCOSE SERPL-MCNC: 97 MG/DL (ref 74–99)
HCT VFR BLD AUTO: 38.8 % (ref 34–48)
HGB BLD-MCNC: 12.7 G/DL (ref 11.5–15.5)
IMM GRANULOCYTES # BLD AUTO: <0.03 K/UL (ref 0–0.58)
IMM GRANULOCYTES NFR BLD: 0 % (ref 0–5)
LYMPHOCYTES NFR BLD: 0.78 K/UL (ref 1.5–4)
LYMPHOCYTES RELATIVE PERCENT: 21 % (ref 20–42)
MAGNESIUM SERPL-MCNC: 2.1 MG/DL (ref 1.6–2.6)
MCH RBC QN AUTO: 31.8 PG (ref 26–35)
MCHC RBC AUTO-ENTMCNC: 32.7 G/DL (ref 32–34.5)
MCV RBC AUTO: 97 FL (ref 80–99.9)
MONOCYTES NFR BLD: 0.32 K/UL (ref 0.1–0.95)
MONOCYTES NFR BLD: 9 % (ref 2–12)
NEUTROPHILS NFR BLD: 69 % (ref 43–80)
NEUTS SEG NFR BLD: 2.55 K/UL (ref 1.8–7.3)
PLATELET # BLD AUTO: 181 K/UL (ref 130–450)
PMV BLD AUTO: 9 FL (ref 7–12)
POTASSIUM SERPL-SCNC: 3.7 MMOL/L (ref 3.5–5)
RBC # BLD AUTO: 4 M/UL (ref 3.5–5.5)
SODIUM SERPL-SCNC: 142 MMOL/L (ref 132–146)
TROPONIN I SERPL HS-MCNC: 11 NG/L (ref 0–9)
TROPONIN I SERPL HS-MCNC: 11 NG/L (ref 0–9)
WBC OTHER # BLD: 3.7 K/UL (ref 4.5–11.5)

## 2025-03-16 PROCEDURE — 85025 COMPLETE CBC W/AUTO DIFF WBC: CPT

## 2025-03-16 PROCEDURE — 71045 X-RAY EXAM CHEST 1 VIEW: CPT

## 2025-03-16 PROCEDURE — 84484 ASSAY OF TROPONIN QUANT: CPT

## 2025-03-16 PROCEDURE — 80048 BASIC METABOLIC PNL TOTAL CA: CPT

## 2025-03-16 PROCEDURE — 93005 ELECTROCARDIOGRAM TRACING: CPT | Performed by: EMERGENCY MEDICINE

## 2025-03-16 PROCEDURE — 83735 ASSAY OF MAGNESIUM: CPT

## 2025-03-16 PROCEDURE — 99285 EMERGENCY DEPT VISIT HI MDM: CPT

## 2025-03-16 ASSESSMENT — PAIN - FUNCTIONAL ASSESSMENT: PAIN_FUNCTIONAL_ASSESSMENT: NONE - DENIES PAIN

## 2025-03-16 ASSESSMENT — LIFESTYLE VARIABLES
HOW MANY STANDARD DRINKS CONTAINING ALCOHOL DO YOU HAVE ON A TYPICAL DAY: PATIENT DOES NOT DRINK
HOW OFTEN DO YOU HAVE A DRINK CONTAINING ALCOHOL: NEVER

## 2025-03-16 NOTE — ED PROVIDER NOTES
[facility] EMERGENCY DEPARTMENT  EMERGENCY DEPARTMENT ENCOUNTER        Pt Name: Pam Barrera  MRN: [id number]  Birthdate 1965  Date of evaluation: 3/16/2025  Provider: Moisés Larry DO  PCP: Suzi Denny MD  Note Started: 4:10 PM EDT 3/16/25    CHIEF COMPLAINT       Chief Complaint   Patient presents with    Palpitations     Reports she had an episode of fluttering in her chest, sees a cardiologist for this        HISTORY OF PRESENT ILLNESS: 1 or more Elements   History From: patient    Limitations to history : None    Pam Barrera is a 59 y.o. female who presents for palpitations that started a couple hours ago.  Patient reports she experienced it for approximately 30 minutes, self resolved.  She was concerned if it would come back to came to the ED for evaluation.  She does have a history of atrial fibrillation, is on full dose aspirin and follows with cardiology. Patient denies fever, chills, headache, shortness of breath, chest pain, abdominal pain, nausea, vomiting, diarrhea, dysuria, hematuria, hematochezia, and melena.  No recent travel, surgery, history of blood clots, leg swelling, or hemoptysis    Nursing Notes were all reviewed and agreed with or any disagreements were addressed in the HPI.        REVIEW OF SYSTEMS :           Positives and Pertinent negatives as per HPI.     SURGICAL HISTORY     Past Surgical History:   Procedure Laterality Date    WISDOM TOOTH EXTRACTION         CURRENTMEDICATIONS       Discharge Medication List as of 3/16/2025  7:11 PM        CONTINUE these medications which have NOT CHANGED    Details   levothyroxine (SYNTHROID) 88 MCG tablet Take 1 tablet by mouth daily, Disp-30 tablet, R-3Normal      apixaban (ELIQUIS) 5 MG TABS tablet Take 1 tablet by mouth 2 times daily, Disp-60 tablet, R-0Normal      vitamin D (CHOLECALCIFEROL) 1000 UNIT TABS tablet Take 800 Units by mouth daily.      naproxen (NAPROSYN) 500 MG tablet Take 1 tablet by

## 2025-03-17 LAB
EKG ATRIAL RATE: 81 BPM
EKG P AXIS: 29 DEGREES
EKG P-R INTERVAL: 154 MS
EKG Q-T INTERVAL: 376 MS
EKG QRS DURATION: 74 MS
EKG QTC CALCULATION (BAZETT): 436 MS
EKG R AXIS: -73 DEGREES
EKG T AXIS: 42 DEGREES
EKG VENTRICULAR RATE: 81 BPM

## 2025-03-17 PROCEDURE — 93010 ELECTROCARDIOGRAM REPORT: CPT | Performed by: INTERNAL MEDICINE

## 2025-08-07 ENCOUNTER — TELEPHONE (OUTPATIENT)
Age: 60
End: 2025-08-07

## 2025-08-08 ENCOUNTER — TELEPHONE (OUTPATIENT)
Age: 60
End: 2025-08-08

## 2025-08-09 ENCOUNTER — HOSPITAL ENCOUNTER (EMERGENCY)
Age: 60
Discharge: HOME | End: 2025-08-09
Payer: COMMERCIAL

## 2025-08-09 VITALS
DIASTOLIC BLOOD PRESSURE: 90 MMHG | SYSTOLIC BLOOD PRESSURE: 169 MMHG | OXYGEN SATURATION: 100 % | RESPIRATION RATE: 16 BRPM | TEMPERATURE: 98.1 F | HEART RATE: 75 BPM

## 2025-08-09 DIAGNOSIS — G89.3 CHRONIC PAIN DUE TO NEOPLASM: Primary | ICD-10-CM

## 2025-08-09 PROCEDURE — 96372 THER/PROPH/DIAG INJ SC/IM: CPT

## 2025-08-09 PROCEDURE — 99284 EMERGENCY DEPT VISIT MOD MDM: CPT

## 2025-08-09 PROCEDURE — 6360000002 HC RX W HCPCS

## 2025-08-09 RX ORDER — MORPHINE SULFATE 15 MG/1
15 TABLET, FILM COATED, EXTENDED RELEASE ORAL 2 TIMES DAILY
Qty: 6 TABLET | Refills: 0 | Status: SHIPPED | OUTPATIENT
Start: 2025-08-09 | End: 2025-08-11 | Stop reason: SDUPTHER

## 2025-08-09 RX ORDER — DIPHENHYDRAMINE HYDROCHLORIDE 50 MG/ML
25 INJECTION, SOLUTION INTRAMUSCULAR; INTRAVENOUS ONCE
Status: COMPLETED | OUTPATIENT
Start: 2025-08-09 | End: 2025-08-09

## 2025-08-09 RX ADMIN — HYDROMORPHONE HYDROCHLORIDE 1 MG: 1 INJECTION, SOLUTION INTRAMUSCULAR; INTRAVENOUS; SUBCUTANEOUS at 21:03

## 2025-08-09 RX ADMIN — DIPHENHYDRAMINE HYDROCHLORIDE 25 MG: 50 INJECTION INTRAMUSCULAR; INTRAVENOUS at 21:02

## 2025-08-09 ASSESSMENT — PAIN DESCRIPTION - ORIENTATION: ORIENTATION: MID

## 2025-08-09 ASSESSMENT — PAIN SCALES - GENERAL
PAINLEVEL_OUTOF10: 9
PAINLEVEL_OUTOF10: 7

## 2025-08-09 ASSESSMENT — PAIN DESCRIPTION - LOCATION: LOCATION: BACK

## 2025-08-09 ASSESSMENT — LIFESTYLE VARIABLES
HOW OFTEN DO YOU HAVE A DRINK CONTAINING ALCOHOL: NEVER
HOW MANY STANDARD DRINKS CONTAINING ALCOHOL DO YOU HAVE ON A TYPICAL DAY: PATIENT DOES NOT DRINK

## 2025-08-09 ASSESSMENT — PAIN - FUNCTIONAL ASSESSMENT
PAIN_FUNCTIONAL_ASSESSMENT: PREVENTS OR INTERFERES SOME ACTIVE ACTIVITIES AND ADLS
PAIN_FUNCTIONAL_ASSESSMENT: 0-10

## 2025-08-09 ASSESSMENT — PAIN DESCRIPTION - DESCRIPTORS: DESCRIPTORS: STABBING;BURNING

## 2025-08-11 ENCOUNTER — OFFICE VISIT (OUTPATIENT)
Age: 60
End: 2025-08-11
Payer: COMMERCIAL

## 2025-08-11 VITALS
OXYGEN SATURATION: 99 % | TEMPERATURE: 97.3 F | SYSTOLIC BLOOD PRESSURE: 156 MMHG | WEIGHT: 132.3 LBS | HEART RATE: 94 BPM | DIASTOLIC BLOOD PRESSURE: 91 MMHG | BODY MASS INDEX: 20.12 KG/M2

## 2025-08-11 DIAGNOSIS — Z79.891 USE OF OPIATES FOR THERAPEUTIC PURPOSES: Primary | ICD-10-CM

## 2025-08-11 DIAGNOSIS — C34.90 LUNG CANCER METASTATIC TO BONE (HCC): ICD-10-CM

## 2025-08-11 DIAGNOSIS — Z51.5 PALLIATIVE CARE BY SPECIALIST: ICD-10-CM

## 2025-08-11 DIAGNOSIS — C79.51 LUNG CANCER METASTATIC TO BONE (HCC): ICD-10-CM

## 2025-08-11 DIAGNOSIS — Z79.891 USE OF OPIATES FOR THERAPEUTIC PURPOSES: ICD-10-CM

## 2025-08-11 DIAGNOSIS — G89.3 PAIN DUE TO NEOPLASM: ICD-10-CM

## 2025-08-11 PROCEDURE — 99205 OFFICE O/P NEW HI 60 MIN: CPT | Performed by: NURSE PRACTITIONER

## 2025-08-11 RX ORDER — ACETAMINOPHEN 500 MG
1000 TABLET ORAL EVERY 8 HOURS PRN
COMMUNITY

## 2025-08-11 RX ORDER — OXYCODONE HYDROCHLORIDE 5 MG/1
5-10 TABLET ORAL
COMMUNITY
Start: 2025-08-04 | End: 2025-09-03

## 2025-08-11 RX ORDER — IBUPROFEN 200 MG
200 TABLET ORAL EVERY 8 HOURS PRN
COMMUNITY
End: 2025-08-11 | Stop reason: ALTCHOICE

## 2025-08-11 RX ORDER — MORPHINE SULFATE 15 MG/1
15 TABLET, FILM COATED, EXTENDED RELEASE ORAL 2 TIMES DAILY
Qty: 30 TABLET | Refills: 0 | Status: SHIPPED | OUTPATIENT
Start: 2025-08-11 | End: 2025-08-26

## 2025-08-11 RX ORDER — ALBUTEROL SULFATE 90 UG/1
2 INHALANT RESPIRATORY (INHALATION) EVERY 4 HOURS PRN
COMMUNITY

## 2025-08-11 RX ORDER — CYCLOBENZAPRINE HCL 10 MG
10 TABLET ORAL 3 TIMES DAILY PRN
COMMUNITY
Start: 2025-08-04

## 2025-08-11 RX ORDER — GABAPENTIN 300 MG/1
CAPSULE ORAL
COMMUNITY
Start: 2025-07-01 | End: 2025-08-11 | Stop reason: ALTCHOICE

## 2025-08-11 RX ORDER — ASPIRIN 325 MG
325 TABLET ORAL DAILY
COMMUNITY

## 2025-08-11 RX ORDER — IBUPROFEN 600 MG/1
600 TABLET, FILM COATED ORAL 3 TIMES DAILY PRN
Qty: 30 TABLET | Refills: 0 | Status: SHIPPED | OUTPATIENT
Start: 2025-08-11

## 2025-08-11 RX ORDER — FOLIC ACID 1 MG/1
1 TABLET ORAL DAILY
COMMUNITY
Start: 2025-08-01 | End: 2026-01-28

## 2025-08-11 RX ORDER — LEVOTHYROXINE, LIOTHYRONINE 38; 9 UG/1; UG/1
60 TABLET ORAL EVERY MORNING
COMMUNITY

## 2025-08-12 PROBLEM — C34.91 PRIMARY LUNG CANCER WITH METASTASIS FROM LUNG TO OTHER SITE, RIGHT (HCC): Status: ACTIVE | Noted: 2025-08-01

## 2025-08-12 LAB
6-MONOACETYLMORPHINE, URINE: NEGATIVE
ABNORMAL SPECIMEN VALIDITY TEST: ABNORMAL
ALCOHOL URINE: NOT DETECTED MG/DL
AMPHETAMINE SCREEN URINE: NEGATIVE
BARBITURATE SCREEN URINE: NEGATIVE
BENZODIAZEPINE SCREEN, URINE: NEGATIVE
BUPRENORPHINE URINE: NEGATIVE
CANNABINOID SCREEN URINE: NEGATIVE
COCAINE METABOLITE, URINE: NEGATIVE
FENTANYL URINE: NEGATIVE
INTEGRITY CHECK, CREATININE, URINE: 21.2 MG/DL (ref 22–250)
INTEGRITY CHECK, OXIDANT, URINE: 10 MG/L
INTEGRITY CHECK, PH, URINE: 6.6 (ref 4.5–8)
INTEGRITY CHECK, SPECIFIC GRAVITY, URINE: 1.01 (ref 1–1.03)
METHADONE SCREEN, URINE: NEGATIVE
OPIATES, URINE: POSITIVE
OXYCODONE SCREEN URINE: POSITIVE
PCP,URINE: NEGATIVE
TEST INFORMATION: ABNORMAL
TRAMADOL, URINE: NEGATIVE

## 2025-08-15 LAB
ALPRAZOLAM: <5 NG/ML
CHLORDIAZEPOXIDE: <20 NG/ML
CLONAZEPAM, URINE SCREEN: <5 NG/ML
CODEINE, URINE: <20 NG/ML
DIAZEPAM URINE QUANT: <20 NG/ML
HYDROCODONE, URINE CONFIRMATION: <20 NG/ML
HYDROMORPHONE, URINE CONFIRMATION: <20 NG/ML
LORAZEPAM: <20 NG/ML
MIDAZOLAM, URINE SCREEN: <20 NG/ML
MIDAZOLAM, URINE SCREEN: <20 NG/ML
MORPHINE, URINE CONFIRMATION: 132 NG/ML
NORDIAZEPAM URINE SCREEN: <20 NG/ML
NORHYDROCODONE, URINE: <20 NG/ML
NOROXYCODONE, URINE: 414 NG/ML
NOROXYMORPHONE, URINE: 136 NG/ML
OH-ALPRAZOLAM SCREEN: <5 NG/ML
OPIATE, 6-AM URINE: <10 NG/ML
OXAZEPAM URINE SCREEN: <20 NG/ML
OXYCODONE, URINE CONFIRMATION: 98 NG/ML
OXYMORPHONE, URINE CONFIRMATION: <20 NG/ML
TEMAZEPAM,URINE SCREEN: <20 NG/ML
UR 7-AMINOCLONAZEPAM: <5 NG/ML

## 2025-08-18 ENCOUNTER — TELEPHONE (OUTPATIENT)
Age: 60
End: 2025-08-18

## 2025-08-19 ENCOUNTER — TELEPHONE (OUTPATIENT)
Age: 60
End: 2025-08-19

## 2025-08-19 DIAGNOSIS — C34.90 LUNG CANCER METASTATIC TO BONE (HCC): Primary | ICD-10-CM

## 2025-08-19 DIAGNOSIS — G89.3 PAIN DUE TO NEOPLASM: ICD-10-CM

## 2025-08-19 DIAGNOSIS — C79.51 LUNG CANCER METASTATIC TO BONE (HCC): Primary | ICD-10-CM

## 2025-08-19 DIAGNOSIS — Z51.5 PALLIATIVE CARE BY SPECIALIST: ICD-10-CM

## 2025-08-19 RX ORDER — MORPHINE SULFATE 30 MG/1
30 TABLET, FILM COATED, EXTENDED RELEASE ORAL 2 TIMES DAILY
Qty: 30 TABLET | Refills: 0 | Status: SHIPPED | OUTPATIENT
Start: 2025-08-19 | End: 2025-09-03

## 2025-08-19 RX ORDER — GABAPENTIN 300 MG/1
300 CAPSULE ORAL 3 TIMES DAILY
Qty: 90 CAPSULE | Refills: 2 | Status: SHIPPED | OUTPATIENT
Start: 2025-08-19 | End: 2025-11-17

## 2025-08-19 RX ORDER — CYCLOBENZAPRINE HCL 10 MG
10 TABLET ORAL 3 TIMES DAILY PRN
Qty: 90 TABLET | Refills: 2 | Status: SHIPPED | OUTPATIENT
Start: 2025-08-19 | End: 2025-11-17

## 2025-08-20 ENCOUNTER — TELEPHONE (OUTPATIENT)
Age: 60
End: 2025-08-20

## 2025-08-20 DIAGNOSIS — C79.51 LUNG CANCER METASTATIC TO BONE (HCC): Primary | ICD-10-CM

## 2025-08-20 DIAGNOSIS — C34.90 LUNG CANCER METASTATIC TO BONE (HCC): Primary | ICD-10-CM
